# Patient Record
Sex: FEMALE | Race: BLACK OR AFRICAN AMERICAN | NOT HISPANIC OR LATINO | Employment: OTHER | URBAN - METROPOLITAN AREA
[De-identification: names, ages, dates, MRNs, and addresses within clinical notes are randomized per-mention and may not be internally consistent; named-entity substitution may affect disease eponyms.]

---

## 2013-01-21 LAB — CRC RECOMMENDATION EXT: NORMAL

## 2021-07-15 LAB — BCS RECOMMENDATION EXT: NORMAL

## 2022-12-03 ENCOUNTER — DOCUMENTATION ONLY (OUTPATIENT)
Dept: INTERNAL MEDICINE | Facility: CLINIC | Age: 62
End: 2022-12-03
Payer: COMMERCIAL

## 2022-12-29 ENCOUNTER — OFFICE VISIT (OUTPATIENT)
Dept: HEMATOLOGY/ONCOLOGY | Facility: CLINIC | Age: 62
End: 2022-12-29
Payer: COMMERCIAL

## 2022-12-29 VITALS
SYSTOLIC BLOOD PRESSURE: 112 MMHG | BODY MASS INDEX: 53.92 KG/M2 | HEIGHT: 62 IN | TEMPERATURE: 98 F | RESPIRATION RATE: 18 BRPM | DIASTOLIC BLOOD PRESSURE: 69 MMHG | OXYGEN SATURATION: 100 % | HEART RATE: 85 BPM | WEIGHT: 293 LBS

## 2022-12-29 DIAGNOSIS — C73 THYROID CANCER: Primary | ICD-10-CM

## 2022-12-29 PROCEDURE — 99214 PR OFFICE/OUTPT VISIT, EST, LEVL IV, 30-39 MIN: ICD-10-PCS | Mod: ,,, | Performed by: NURSE PRACTITIONER

## 2022-12-29 PROCEDURE — 99214 OFFICE O/P EST MOD 30 MIN: CPT | Mod: ,,, | Performed by: NURSE PRACTITIONER

## 2022-12-29 PROCEDURE — 3074F PR MOST RECENT SYSTOLIC BLOOD PRESSURE < 130 MM HG: ICD-10-PCS | Mod: CPTII,,, | Performed by: NURSE PRACTITIONER

## 2022-12-29 PROCEDURE — 4010F PR ACE/ARB THEARPY RXD/TAKEN: ICD-10-PCS | Mod: CPTII,,, | Performed by: NURSE PRACTITIONER

## 2022-12-29 PROCEDURE — 3078F PR MOST RECENT DIASTOLIC BLOOD PRESSURE < 80 MM HG: ICD-10-PCS | Mod: CPTII,,, | Performed by: NURSE PRACTITIONER

## 2022-12-29 PROCEDURE — 3008F BODY MASS INDEX DOCD: CPT | Mod: CPTII,,, | Performed by: NURSE PRACTITIONER

## 2022-12-29 PROCEDURE — 1159F PR MEDICATION LIST DOCUMENTED IN MEDICAL RECORD: ICD-10-PCS | Mod: CPTII,,, | Performed by: NURSE PRACTITIONER

## 2022-12-29 PROCEDURE — 4010F ACE/ARB THERAPY RXD/TAKEN: CPT | Mod: CPTII,,, | Performed by: NURSE PRACTITIONER

## 2022-12-29 PROCEDURE — 3008F PR BODY MASS INDEX (BMI) DOCUMENTED: ICD-10-PCS | Mod: CPTII,,, | Performed by: NURSE PRACTITIONER

## 2022-12-29 PROCEDURE — 1159F MED LIST DOCD IN RCRD: CPT | Mod: CPTII,,, | Performed by: NURSE PRACTITIONER

## 2022-12-29 PROCEDURE — 3078F DIAST BP <80 MM HG: CPT | Mod: CPTII,,, | Performed by: NURSE PRACTITIONER

## 2022-12-29 PROCEDURE — 3074F SYST BP LT 130 MM HG: CPT | Mod: CPTII,,, | Performed by: NURSE PRACTITIONER

## 2022-12-29 RX ORDER — ALENDRONATE SODIUM 70 MG/1
70 TABLET ORAL
COMMUNITY
Start: 2022-10-03

## 2022-12-29 RX ORDER — ATORVASTATIN CALCIUM 40 MG/1
40 TABLET, FILM COATED ORAL
COMMUNITY
Start: 2022-11-10

## 2022-12-29 RX ORDER — GLIMEPIRIDE 1 MG/1
1 TABLET ORAL
COMMUNITY

## 2022-12-29 RX ORDER — LISINOPRIL AND HYDROCHLOROTHIAZIDE 12.5; 2 MG/1; MG/1
1 TABLET ORAL
COMMUNITY
Start: 2022-10-04

## 2022-12-29 RX ORDER — METFORMIN HYDROCHLORIDE 500 MG/1
500 TABLET ORAL
COMMUNITY
Start: 2022-10-03

## 2022-12-29 RX ORDER — LEVOTHYROXINE SODIUM 175 UG/1
TABLET ORAL
COMMUNITY
End: 2022-12-30 | Stop reason: SDUPTHER

## 2022-12-29 RX ORDER — FOLIC ACID 1 MG/1
1000 TABLET ORAL
COMMUNITY
Start: 2022-11-10 | End: 2023-02-24 | Stop reason: SDUPTHER

## 2022-12-29 RX ORDER — PIOGLITAZONEHYDROCHLORIDE 45 MG/1
45 TABLET ORAL
COMMUNITY
Start: 2022-10-03

## 2022-12-29 RX ORDER — ACETAMINOPHEN 500 MG
TABLET ORAL
COMMUNITY

## 2022-12-29 NOTE — PROGRESS NOTES
"[facility]    PATIENT: Sarah Majano  MRN: [id number]  DATE: 12/29/2022    Diagnosis: History of thyroid cancer.     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    Colonoscopy 2011  MMG 7/2021  DEXA 5/2019    Subjective:   Interval History:  12/29/2022:  Ms. Majano presents to clinic for a one year follow up visit for history of thyroid cancer.  States she has been feeling "great". Has no complaints.  Appetite is good. She has gained some weight since last visit here, but states she is working on weight loss.    Continues to take Levothyroxine 175 mcg daily.   Up to date on her mammogram. April 2022. Per patient report normal.     Past Medical History:   Diagnosis Date    Diabetes mellitus     Hypertension     Mixed hyperlipidemia     Thyroid disease        Past Surgical HIstory: History reviewed. No pertinent surgical history.    Family History   Problem Relation Age of Onset    Hyperthyroidism Mother     Diabetes Mother     Heart disease Father      Social History:  reports that she has never smoked. She has never used smokeless tobacco. She reports that she does not drink alcohol and does not use drugs.    Allergies:  Review of patient's allergies indicates:  Not on File    Medications:  Current Outpatient Medications   Medication Sig Dispense Refill    alendronate (FOSAMAX) 70 MG tablet Take 70 mg by mouth every 7 days.      atorvastatin (LIPITOR) 40 MG tablet Take 40 mg by mouth.      cholecalciferol, vitamin D3, (VITAMIN D3) 50 mcg (2,000 unit) Cap capsule Vitamin D3   daily      folic acid (FOLVITE) 1 MG tablet Take 1,000 mcg by mouth.      glimepiride (AMARYL) 1 MG tablet Take 1 mg by mouth.      levothyroxine (SYNTHROID, LEVOTHROID) 175 MCG tablet levothyroxine " "175 mcg tablet   TAKE 1 TABLET BY MOUTH ONCE DAILY      lisinopriL-hydrochlorothiazide (PRINZIDE,ZESTORETIC) 20-12.5 mg per tablet Take 1 tablet by mouth.      metFORMIN (GLUCOPHAGE) 500 MG tablet Take 500 mg by mouth.      pioglitazone (ACTOS) 45 MG tablet Take 45 mg by mouth.       No current facility-administered medications for this visit.     Review of Systems:   CONSTITUTIONAL: no fevers, no chills,  weight gain, no fatigue, no weakness  HEMATOLOGIC: no abnormal bleeding, no abnormal bruising, no drenching night sweats  ONCOLOGIC: no new masses or lumps  HEENT: no vision loss, no tinnitus or hearing loss, no nose bleeding, no dysphagia, no odynophagia  CVS: no chest pain, no palpitations, no dyspnea on exertion  RESP: no shortness of breath, no hemoptysis, no cough  BREAST: no nipple discharge, no breast tenderness, no breast masses on self breast examination  GI: no nausea, no vomiting, no diarrhea, no constipation, no melena, no hematochezia, no hematemesis, no abdominal pain, no increase in abdominal girth  : no dysuria, no hematuria, no discharge  GYN: no abnormal vaginal bleeding, no dyspareunia, no vaginal discharge  INTEGUMENT: no rashes, no abnormal bruising, no nail pitting, no hyperpigmentation  NEURO: no falls, no memory loss, no paresthesias or dysesthesias, no urofecal incontinence or retention, no loss of strength on any extremity  MSK: no back pain, no new joint pain, no joint swelling  PSYCH: no suicidal or homicidal ideation, no depression, no insomnia, no anhedonia  ENDOCRINE: no heat or cold intolerance, no polyuria, no polydipsia    ECOG Performance Status: 1   Objective:      Vitals:    12/29/22 1139   BP: 112/69   BP Location: Left arm   Patient Position: Sitting   BP Method: Large (Automatic)   Pulse: 85   Resp: 18   Temp: 98.3 °F (36.8 °C)   TempSrc: Oral   SpO2: 100%   Weight: (!) 173.3 kg (382 lb)   Height: 5' 2" (1.575 m)     BMI: Body mass index is 69.87 kg/m².    Physical " Exam:  GA: AAOx3, NAD. Obese.   HEENT: NCAT, PERRLA, EOMI, moist oropharynx, no oral ulcers  LYMPH: no cervical, axillary or supraclavicular region lymphadenopathy  CVS: s1s2 RRR, no M/R/G  RESP: CTA b/l, no crackles, no wheezes or rhonchi  ABD: soft, NT, ND, BS+, no hepatosplenomegaly  EXT: no deformities, mild b/l pitting pedal edema  SKIN: no rashes, no bruises or purpura, warm and dry  NEURO: normal mentation, strength 5/5 on all 4 extremities, no sensory deficits    Laboratory results:  12/22/2022:  Creatinine 1.2, albumin 3.8, free T4 1.75, TSH 0.45, iron 70, sat% 24, ferritin 102, folate 42.7, B12 721, WBC 4.2, HGB 10.1, .0, platelets 226  9/20/21 Tg Ab <1 Tg <0.1      9/16/21 Cr 1.4 Alb 3.5 TP 8.1 FT4 1.44 AlkPhos 68 AST 8 ALT 15 TSH 0.43 A1C 5.9 Ferritin 132 Folate >20 WBC 5 RBC 3.05 Hg 9.3 MV 98.4  Retic 1.56% Vit D 33.5  6/16/21 Cr 1.4, TSH 1.04 FT4 1.47, thyroglobulin Ab < 0.1, Folate > 20, Ferritin 118, B12 712, albumin urine, <12, Iron 75, TIBC 298, iron sat 25%, WBC 3.4, Hgb 9.4, MCV 99.4, , Vit D 40, retic 1.10%  3/19/21 folate > 20, B12 864, retic 1.90%, WBC 5.7, Hgb 9.3, MCV 97.4, , , urine albumin < 12.0, Vit D 45, EPO 13.5, Thyroglobulin Ab <1.0, Thyroglobulin <0.1, Cr 1.1, ferritin 100, ESR 88, TSH 1.57, FT4 3.08, hgb electrophoresis pending**  12/16/20  Vit D 40.6 CRP 1.7 urine albumin <12 Cholesterol 11 HLD 62 LDL 42 ESR 61 Iron 60 TIBC 315 A1C 5.8 WBC 4.8 Hg 8.6 .5  FT4 1.33 TSH 26.98 Folate 8.3 B12 880 ferritin 101 Cr 1.2 TP 8.2 Retic 2.11 Copper 151 SPEP w/ RIN no M spike Abs kappa 35.6 Abs lambda 15.4 SFLC ratio 2.31  12/16/20 TG ab <1 Tg 0.1  9/8/20 Cr 1.9 CO2 20.9 TSH 62.79 A1C 5.7 Alb 4 TP 8.4 AlkPhos 55 AST 15 ALT 9 FT4 0.76 WBC 4.9 RBC 2.99 Hg 9.7  RDW 14.5  FT3 1.1  5/29/20 TSH 81.49 Free T4 0.73    Path:  3/31/2004 Thyroid left lobectomy: multinodular thyroid parenchyma. Small hypercellular dominant nodule. No  evidence of malignancy identified. No parathyroid glands identified.  3/8/2004 Right thyroid lobectomy: oncocytic carcinoma (Hurthle cell carcinoma), minimally invasive 3.8cm, capsular invasion present.    Imagin/15/21 MMG: BIRADS2  20 US thyroid: 10x8.2x6mm residual thyroid tissue in right thyroid bed  20 Screening MMG: BIRADS 2  19 CT chest w/o contrast: prominent solitary pulmonary cyst on right most compatible with benign bulla/bleb. 4.5x4.2x4cm  4/15/16 US thyroid: small amount of residual thyroid tissue on the right    Assessment:   1. History of thyroid cancer Z85.850 S/p left and right thyroid lobectomy 3/2004 with Hurthle cell carcinoma, minimally invasive  Pt reports that she received FLORES, but have no records available  Thyroid US showing some residual thyroid tissue . Repeat US thyroid 2020 unchanged  TSH throughout  has been significantly elevated, most recent 2021 WNL 0.4  Tg and Tg Ab unremarkable  2019 CT chest without any evidence to suggest metastatic disease though solitary pulm cyst consistent with bleb/bulla was seen on right side.     --2022 - TSH normal range. FT4 mild elevation. She states compliance with taking Levothyroxine. She is feeling well.   --Recommend continue to take Levothyroxine 175 mcg. Total protein elevated 8.4. Patient states has not been drinking much water.  --Recommend she hydrate daily with water(recommended daily amount reviewed). Recheck TSH/FT4 in 2 weeks(per patient request)    2. Macrocytic anemia D53.9 Pt reports a hx of KEY, however her labs show mild macrocytosis with normal PLT and WBC counts. Likely related to CKD  SPEP, RIN, SFLC normal, B12 normal, copper normal, Retic inappropriately normal, folate deficient. She denies any bleeding.   HGB and iron stores, decreased from previous values just a week ago. Still WNL. Most recent labs checked by PCP. She plans on reviewing labs with her at appointment next week and see if  she wants her to start oral iron. Continue with folic acid 1mg daily.     3. Morbid obesity E66.01    -- She has gained weight.Discussed weight loss. States she is planning on working on weight loss.     4. Health maintenance - per patient report up to date on mammogram. Done 4/2022 and was normal.   Plan:   Continue current dose of Levothyroxine 175 mcg daily. RX sent to pharmacy.   Hydrate well daily with water.   Recheck thyroid levels in 2 weeks.   Return to clinic in 1 year with CBC, CMP, TG AB, TG, TSH, FT4 and ferritin/iron/tibc    Safia Bunn NP-C    ADDENDUM:   1/19/2023. Called patient with TSH results from 01/12/2023. [id number]. States was out of Thryoid medication for 3 days prior to getting test done.   Is back on levothyroxine 175 mcg now. Recommend rechecking TSH in 6 weeks. Patient agrees.

## 2022-12-30 RX ORDER — LEVOTHYROXINE SODIUM 175 UG/1
TABLET ORAL
Qty: 30 TABLET | Refills: 5 | OUTPATIENT
Start: 2022-12-30 | End: 2023-01-09

## 2023-01-09 DIAGNOSIS — C73 THYROID CANCER: Primary | ICD-10-CM

## 2023-01-09 RX ORDER — LEVOTHYROXINE SODIUM 175 UG/1
175 TABLET ORAL
Qty: 30 TABLET | Refills: 5 | Status: SHIPPED | OUTPATIENT
Start: 2023-01-09 | End: 2023-07-06

## 2023-02-24 DIAGNOSIS — C73 THYROID CANCER: Primary | ICD-10-CM

## 2023-02-24 RX ORDER — FOLIC ACID 1 MG/1
1000 TABLET ORAL DAILY
Qty: 30 TABLET | Refills: 3 | Status: SHIPPED | OUTPATIENT
Start: 2023-02-24 | End: 2023-07-06 | Stop reason: SDUPTHER

## 2023-07-06 DIAGNOSIS — C73 THYROID CANCER: ICD-10-CM

## 2023-07-06 RX ORDER — LEVOTHYROXINE SODIUM 175 UG/1
175 TABLET ORAL
Qty: 30 TABLET | Refills: 5 | Status: SHIPPED | OUTPATIENT
Start: 2023-07-06 | End: 2024-01-02

## 2023-07-06 RX ORDER — FOLIC ACID 1 MG/1
1000 TABLET ORAL DAILY
Qty: 30 TABLET | Refills: 3 | Status: SHIPPED | OUTPATIENT
Start: 2023-07-06 | End: 2023-11-13

## 2023-07-21 ENCOUNTER — TELEPHONE (OUTPATIENT)
Dept: HEMATOLOGY/ONCOLOGY | Facility: CLINIC | Age: 63
End: 2023-07-21
Payer: COMMERCIAL

## 2023-07-21 NOTE — TELEPHONE ENCOUNTER
----- Message from Faisal Molina MD sent at 7/20/2023 10:25 AM CDT -----  She needs an appointment with her PCP for  anemia, not us.    If the PCP does the basic workup and is still not able to figure it out she can be seen in our clinic.      Thank you.     ----- Message -----  From: Chikis Sandy LPN  Sent: 7/20/2023   7:58 AM CDT  To: Faisal Molina MD    Labs scanned in Epic. Please advise on if Pt needs sooner appt.--TYLER VILLAREAL  ----- Message -----  From: Herson Hutton  Sent: 7/17/2023   1:38 PM CDT  To: Chikis Sandy LPN    Good afternoon,     Pt is scheduled for follow up in Dec. Nursing home called today requesting an earlier visit due to abnormal labs not critical. I instructed nursing home to fax over the labs for provider to review prior to scheduling her.     Thank you,  Herson

## 2023-07-24 NOTE — TELEPHONE ENCOUNTER
Dr. Matute's office returned call notified of needing follow up and work up with PCP for anemia.--SC, LPN

## 2023-08-09 DIAGNOSIS — D50.0 ANEMIA DUE TO CHRONIC BLOOD LOSS: Primary | ICD-10-CM

## 2023-08-16 ENCOUNTER — OFFICE VISIT (OUTPATIENT)
Dept: HEMATOLOGY/ONCOLOGY | Facility: CLINIC | Age: 63
End: 2023-08-16
Payer: COMMERCIAL

## 2023-08-16 VITALS
DIASTOLIC BLOOD PRESSURE: 76 MMHG | RESPIRATION RATE: 18 BRPM | HEIGHT: 62 IN | TEMPERATURE: 98 F | SYSTOLIC BLOOD PRESSURE: 153 MMHG | OXYGEN SATURATION: 98 % | BODY MASS INDEX: 53.92 KG/M2 | WEIGHT: 293 LBS | HEART RATE: 70 BPM

## 2023-08-16 DIAGNOSIS — D50.0 ANEMIA DUE TO CHRONIC BLOOD LOSS: ICD-10-CM

## 2023-08-16 DIAGNOSIS — D53.9 MACROCYTIC ANEMIA: ICD-10-CM

## 2023-08-16 DIAGNOSIS — C73 THYROID CANCER: ICD-10-CM

## 2023-08-16 PROCEDURE — 4010F ACE/ARB THERAPY RXD/TAKEN: CPT | Mod: CPTII,,, | Performed by: STUDENT IN AN ORGANIZED HEALTH CARE EDUCATION/TRAINING PROGRAM

## 2023-08-16 PROCEDURE — 99215 OFFICE O/P EST HI 40 MIN: CPT | Mod: ,,, | Performed by: STUDENT IN AN ORGANIZED HEALTH CARE EDUCATION/TRAINING PROGRAM

## 2023-08-16 PROCEDURE — 3008F BODY MASS INDEX DOCD: CPT | Mod: CPTII,,, | Performed by: STUDENT IN AN ORGANIZED HEALTH CARE EDUCATION/TRAINING PROGRAM

## 2023-08-16 PROCEDURE — 3077F SYST BP >= 140 MM HG: CPT | Mod: CPTII,,, | Performed by: STUDENT IN AN ORGANIZED HEALTH CARE EDUCATION/TRAINING PROGRAM

## 2023-08-16 PROCEDURE — 99215 PR OFFICE/OUTPT VISIT, EST, LEVL V, 40-54 MIN: ICD-10-PCS | Mod: ,,, | Performed by: STUDENT IN AN ORGANIZED HEALTH CARE EDUCATION/TRAINING PROGRAM

## 2023-08-16 PROCEDURE — 3078F DIAST BP <80 MM HG: CPT | Mod: CPTII,,, | Performed by: STUDENT IN AN ORGANIZED HEALTH CARE EDUCATION/TRAINING PROGRAM

## 2023-08-16 PROCEDURE — 3077F PR MOST RECENT SYSTOLIC BLOOD PRESSURE >= 140 MM HG: ICD-10-PCS | Mod: CPTII,,, | Performed by: STUDENT IN AN ORGANIZED HEALTH CARE EDUCATION/TRAINING PROGRAM

## 2023-08-16 PROCEDURE — 3008F PR BODY MASS INDEX (BMI) DOCUMENTED: ICD-10-PCS | Mod: CPTII,,, | Performed by: STUDENT IN AN ORGANIZED HEALTH CARE EDUCATION/TRAINING PROGRAM

## 2023-08-16 PROCEDURE — 4010F PR ACE/ARB THEARPY RXD/TAKEN: ICD-10-PCS | Mod: CPTII,,, | Performed by: STUDENT IN AN ORGANIZED HEALTH CARE EDUCATION/TRAINING PROGRAM

## 2023-08-16 PROCEDURE — 3078F PR MOST RECENT DIASTOLIC BLOOD PRESSURE < 80 MM HG: ICD-10-PCS | Mod: CPTII,,, | Performed by: STUDENT IN AN ORGANIZED HEALTH CARE EDUCATION/TRAINING PROGRAM

## 2023-08-16 PROCEDURE — 1159F PR MEDICATION LIST DOCUMENTED IN MEDICAL RECORD: ICD-10-PCS | Mod: CPTII,,, | Performed by: STUDENT IN AN ORGANIZED HEALTH CARE EDUCATION/TRAINING PROGRAM

## 2023-08-16 PROCEDURE — 1159F MED LIST DOCD IN RCRD: CPT | Mod: CPTII,,, | Performed by: STUDENT IN AN ORGANIZED HEALTH CARE EDUCATION/TRAINING PROGRAM

## 2023-08-16 NOTE — PROGRESS NOTES
PATIENT: Sarah Majano  MRN: [id number]  DATE: 2023    Diagnosis: History of thyroid cancer.  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    Colonoscopy 2023  MMG 2021  DEXA 2019    Patient was following with us for a history of thyroid cancer however was referred again for a new diagnosis of worsening normocytic anemia.    Subjective:   Interval History:  Today, 2023, patient denies any acute concerns today.  She denies any chest pain, shortness of breath, fatigue.  She denies any GI blood loss.  She reports undergoing colonoscopy earlier this year with Dr. Flowers which was reportedly within normal limits.  She denies any new medications including blood thinners.    Past Medical History:   Diagnosis Date    Anemia     Diabetes mellitus     Hurthle cell carcinoma of thyroid 2004    Hypertension     Mixed hyperlipidemia     Thyroid disease        Past Surgical HIstory:   Past Surgical History:   Procedure Laterality Date     SECTION, CLASSIC      THYROIDECTOMY         Family History   Problem Relation Age of Onset    Hyperthyroidism Mother     Diabetes Mother     Heart disease Father      Social History:  reports that she has never smoked. She has never used smokeless tobacco. She reports that she does not drink alcohol and does not use drugs.    Allergies:  Review of patient's allergies indicates:  Not on File    Medications:  Current Outpatient Medications   Medication Sig Dispense Refill    alendronate (FOSAMAX) 70 MG tablet Take 70 mg by mouth every 7 days.      atorvastatin (LIPITOR) 40 MG tablet Take 40 mg by mouth.      cholecalciferol, vitamin D3, (VITAMIN  D3) 50 mcg (2,000 unit) Cap capsule Vitamin D3   daily      folic acid (FOLVITE) 1 MG tablet Take 1 tablet (1,000 mcg total) by mouth once daily. 30 tablet 3    glimepiride (AMARYL) 1 MG tablet Take 1 mg by mouth.      levothyroxine (SYNTHROID, LEVOTHROID) 175 MCG tablet Take 1 tablet (175 mcg total) by mouth before breakfast. 30 tablet 5    lisinopriL-hydrochlorothiazide (PRINZIDE,ZESTORETIC) 20-12.5 mg per tablet Take 1 tablet by mouth.      metFORMIN (GLUCOPHAGE) 500 MG tablet Take 500 mg by mouth.      pioglitazone (ACTOS) 45 MG tablet Take 45 mg by mouth.       No current facility-administered medications for this visit.     Review of Systems:   CONSTITUTIONAL: no fevers, no chills,  weight gain, no fatigue, no weakness  HEMATOLOGIC: no abnormal bleeding, no abnormal bruising, no drenching night sweats  ONCOLOGIC: no new masses or lumps  HEENT: no vision loss, no tinnitus or hearing loss, no nose bleeding, no dysphagia, no odynophagia  CVS: no chest pain, no palpitations, no dyspnea on exertion  RESP: no shortness of breath, no hemoptysis, no cough  BREAST: no nipple discharge, no breast tenderness, no breast masses on self breast examination  GI: no nausea, no vomiting, no diarrhea, no constipation, no melena, no hematochezia, no hematemesis, no abdominal pain, no increase in abdominal girth  : no dysuria, no hematuria, no discharge  GYN: no abnormal vaginal bleeding, no dyspareunia, no vaginal discharge  INTEGUMENT: no rashes, no abnormal bruising, no nail pitting, no hyperpigmentation  NEURO: no falls, no memory loss, no paresthesias or dysesthesias, no urofecal incontinence or retention, no loss of strength on any extremity  MSK: no back pain, no new joint pain, no joint swelling  PSYCH: no suicidal or homicidal ideation, no depression, no insomnia, no anhedonia  ENDOCRINE: no heat or cold intolerance, no polyuria, no polydipsia    ECOG Performance Status: 1   Objective:      Vitals:    08/16/23 1458  "  BP: (!) 153/76   BP Location: Left arm   Patient Position: Sitting   BP Method: Large (Automatic)   Pulse: 70   Resp: 18   Temp: 97.9 °F (36.6 °C)   TempSrc: Oral   SpO2: 98%   Weight: (!) 175.3 kg (386 lb 6.4 oz)   Height: 5' 2" (1.575 m)       BMI: Body mass index is 70.67 kg/m².    Physical Exam:  GA: AAOx3, NAD. Obese.   HEENT: NCAT, no oral ulcers  LYMPH: no cervical, axillary or supraclavicular region lymphadenopathy  CVS: s1s2 RRR, + murmur  RESP: CTA b/l, no crackles, no wheezes or rhonchi  ABD: soft, NT, ND, BS+, no hepatosplenomegaly  EXT: no deformities, mild b/l pitting pedal edema  SKIN: no rashes, no bruises or purpura, warm and dry  NEURO: normal mentation, strength 5/5 on all 4 extremities, no sensory deficits    Laboratory results:    07/12/2023 vitamin B12 587, folic acid more than 20, hemoglobin 8.0, , platelet count 232  06/12/2023 creatinine 1.64, LFTs within normal limits, albumin 4 point hemoglobin 8.3, ,  ferritin 130, iron saturation 23, iron 68, TIBC 296.     12/22/2022:  Creatinine 1.2, albumin 3.8, free T4 1.75, TSH 0.45, iron 70, sat% 24, ferritin 102, folate 42.7, B12 721, WBC 4.2, HGB 10.1, .0, platelets 226  9/20/21 Tg Ab <1 Tg <0.1      9/16/21 Cr 1.4 Alb 3.5 TP 8.1 FT4 1.44 AlkPhos 68 AST 8 ALT 15 TSH 0.43 A1C 5.9 Ferritin 132 Folate >20 WBC 5 RBC 3.05 Hg 9.3 MV 98.4  Retic 1.56% Vit D 33.5  6/16/21 Cr 1.4, TSH 1.04 FT4 1.47, thyroglobulin Ab < 0.1, Folate > 20, Ferritin 118, B12 712, albumin urine, <12, Iron 75, TIBC 298, iron sat 25%, WBC 3.4, Hgb 9.4, MCV 99.4, , Vit D 40, retic 1.10%  3/19/21 folate > 20, B12 864, retic 1.90%, WBC 5.7, Hgb 9.3, MCV 97.4, , , urine albumin < 12.0, Vit D 45, EPO 13.5, Thyroglobulin Ab <1.0, Thyroglobulin <0.1, Cr 1.1, ferritin 100, ESR 88, TSH 1.57, FT4 3.08, hgb electrophoresis pending**  12/16/20  Vit D 40.6 CRP 1.7 urine albumin <12 Cholesterol 11 HLD 62 LDL 42 ESR 61 Iron 60 TIBC " 315 A1C 5.8 WBC 4.8 Hg 8.6 .5  FT4 1.33 TSH 26.98 Folate 8.3 B12 880 ferritin 101 Cr 1.2 TP 8.2 Retic 2.11 Copper 151 SPEP w/ RIN no M spike Abs kappa 35.6 Abs lambda 15.4 SFLC ratio 2.31  20 TG ab <1 Tg 0.1  20 Cr 1.9 CO2 20.9 TSH 62.79 A1C 5.7 Alb 4 TP 8.4 AlkPhos 55 AST 15 ALT 9 FT4 0.76 WBC 4.9 RBC 2.99 Hg 9.7  RDW 14.5  FT3 1.1  20 TSH 81.49 Free T4 0.73    Path:  3/31/2004 Thyroid left lobectomy: multinodular thyroid parenchyma. Small hypercellular dominant nodule. No evidence of malignancy identified. No parathyroid glands identified.  3/8/2004 Right thyroid lobectomy: oncocytic carcinoma (Hurthle cell carcinoma), minimally invasive 3.8cm, capsular invasion present.    Imagin/15/21 MMG: BIRADS2  20 US thyroid: 10x8.2x6mm residual thyroid tissue in right thyroid bed  20 Screening MMG: BIRADS 2  19 CT chest w/o contrast: prominent solitary pulmonary cyst on right most compatible with benign bulla/bleb. 4.5x4.2x4cm  4/15/16 US thyroid: small amount of residual thyroid tissue on the right    Assessment:     # Macrocytic anemia D53.9 Pt reports a hx of KEY, however her labs show mild macrocytosis with normal PLT and WBC counts. Likely related to CKD  During her initial visit SPEP, RIN, SFLC normal, B12 normal, copper normal, Retic inappropriately normal, folate deficient.  She was started on folic acid 1 mg q.day   More recently she was found to have worsening anemia with a hemoglobin on 8 grams/deciliter prompting a early a follow-up visit with us.          # History of thyroid cancer Z85.850 S/p left and right thyroid lobectomy 3/2004 with Hurthle cell carcinoma, minimally invasive  Pt reports that she received FLORES, but have no records available  Thyroid US showing some residual thyroid tissue . Repeat US thyroid 2020 unchanged  TSH throughout  has been significantly elevated,  2021 WNL 0.4  Tg and Tg Ab unremarkable  2019 CT chest  without any evidence to suggest metastatic disease though solitary pulm cyst consistent with bleb/bulla was seen on right side.   12/30/2022 - TSH normal range. FT4 mild elevation.           Plan:       Noted worsening microcytic anemia with a hemoglobin around 8 grams/deciliter.  Patient's previous baseline was around 9-10 grams/deciliter.    Will plan to repeat patient's blood work including CBC, CMP, path review of smear, retic count, folic acid, vitamin B12 level, myeloma panel, copper, haptoglobin, LDH today  If above workup is negative, will proceed with a bone marrow biopsy to rule out primary bone marrow disorders in the setting of worsening microcytic anemia.    Plan to follow-up in clinic in 2-3 weeks to discuss above workup and further treatment recommendations.        A total of  40 minutes were spent in review of records, interpretation of test, coordination of care, discussion and counseling with the patient.        Portions of the record may have been created with voice recognition software. Occasional wrong-word or sound-a-like substitutions may have occurred due to the inherent limitations of voice recognition software. Read the chart carefully and recognize, using context, where substitutions have occurred.

## 2023-09-07 ENCOUNTER — OFFICE VISIT (OUTPATIENT)
Dept: HEMATOLOGY/ONCOLOGY | Facility: CLINIC | Age: 63
End: 2023-09-07
Payer: COMMERCIAL

## 2023-09-07 VITALS
HEART RATE: 88 BPM | RESPIRATION RATE: 18 BRPM | OXYGEN SATURATION: 95 % | BODY MASS INDEX: 53.92 KG/M2 | SYSTOLIC BLOOD PRESSURE: 172 MMHG | DIASTOLIC BLOOD PRESSURE: 77 MMHG | HEIGHT: 62 IN | TEMPERATURE: 98 F | WEIGHT: 293 LBS

## 2023-09-07 DIAGNOSIS — C73 THYROID CANCER: Primary | ICD-10-CM

## 2023-09-07 DIAGNOSIS — D53.9 MACROCYTIC ANEMIA: ICD-10-CM

## 2023-09-07 PROCEDURE — 3008F PR BODY MASS INDEX (BMI) DOCUMENTED: ICD-10-PCS | Mod: CPTII,,, | Performed by: STUDENT IN AN ORGANIZED HEALTH CARE EDUCATION/TRAINING PROGRAM

## 2023-09-07 PROCEDURE — 4010F PR ACE/ARB THEARPY RXD/TAKEN: ICD-10-PCS | Mod: CPTII,,, | Performed by: STUDENT IN AN ORGANIZED HEALTH CARE EDUCATION/TRAINING PROGRAM

## 2023-09-07 PROCEDURE — 3078F PR MOST RECENT DIASTOLIC BLOOD PRESSURE < 80 MM HG: ICD-10-PCS | Mod: CPTII,,, | Performed by: STUDENT IN AN ORGANIZED HEALTH CARE EDUCATION/TRAINING PROGRAM

## 2023-09-07 PROCEDURE — 4010F ACE/ARB THERAPY RXD/TAKEN: CPT | Mod: CPTII,,, | Performed by: STUDENT IN AN ORGANIZED HEALTH CARE EDUCATION/TRAINING PROGRAM

## 2023-09-07 PROCEDURE — 3077F SYST BP >= 140 MM HG: CPT | Mod: CPTII,,, | Performed by: STUDENT IN AN ORGANIZED HEALTH CARE EDUCATION/TRAINING PROGRAM

## 2023-09-07 PROCEDURE — 3077F PR MOST RECENT SYSTOLIC BLOOD PRESSURE >= 140 MM HG: ICD-10-PCS | Mod: CPTII,,, | Performed by: STUDENT IN AN ORGANIZED HEALTH CARE EDUCATION/TRAINING PROGRAM

## 2023-09-07 PROCEDURE — 3008F BODY MASS INDEX DOCD: CPT | Mod: CPTII,,, | Performed by: STUDENT IN AN ORGANIZED HEALTH CARE EDUCATION/TRAINING PROGRAM

## 2023-09-07 PROCEDURE — 99214 OFFICE O/P EST MOD 30 MIN: CPT | Mod: ,,, | Performed by: STUDENT IN AN ORGANIZED HEALTH CARE EDUCATION/TRAINING PROGRAM

## 2023-09-07 PROCEDURE — 3078F DIAST BP <80 MM HG: CPT | Mod: CPTII,,, | Performed by: STUDENT IN AN ORGANIZED HEALTH CARE EDUCATION/TRAINING PROGRAM

## 2023-09-07 PROCEDURE — 1159F MED LIST DOCD IN RCRD: CPT | Mod: CPTII,,, | Performed by: STUDENT IN AN ORGANIZED HEALTH CARE EDUCATION/TRAINING PROGRAM

## 2023-09-07 PROCEDURE — 99214 PR OFFICE/OUTPT VISIT, EST, LEVL IV, 30-39 MIN: ICD-10-PCS | Mod: ,,, | Performed by: STUDENT IN AN ORGANIZED HEALTH CARE EDUCATION/TRAINING PROGRAM

## 2023-09-07 PROCEDURE — 1159F PR MEDICATION LIST DOCUMENTED IN MEDICAL RECORD: ICD-10-PCS | Mod: CPTII,,, | Performed by: STUDENT IN AN ORGANIZED HEALTH CARE EDUCATION/TRAINING PROGRAM

## 2023-09-07 RX ORDER — ALLOPURINOL 100 MG/1
100 TABLET ORAL DAILY
COMMUNITY
Start: 2023-08-15

## 2023-09-07 NOTE — PROGRESS NOTES
PATIENT: Sarah Majano  MRN: [id number]  DATE: 2023    Diagnosis: History of thyroid cancer.  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    Colonoscopy 2023  MMG 2021  DEXA 2019    Patient was following with us for a history of thyroid cancer however was referred again for a new diagnosis of worsening macrocytic anemia.        Subjective:   Interval History:  Today, 2023, patient denies any acute concerns today.  She denies any symptoms of chest pain, shortness of breath, easy bruising, bleeding, decreased appetite, weight loss, night sweats, fevers, chills or new lumps or bumps.  She denies any new medications, ER or hospital visits since last follow-up visit with us.    Past Medical History:   Diagnosis Date    Anemia     Diabetes mellitus     Hurthle cell carcinoma of thyroid 2004    Hypertension     Mixed hyperlipidemia     Thyroid disease        Past Surgical HIstory:   Past Surgical History:   Procedure Laterality Date     SECTION, CLASSIC      THYROIDECTOMY         Family History   Problem Relation Age of Onset    Hyperthyroidism Mother     Diabetes Mother     Heart disease Father      Social History:  reports that she has never smoked. She has never used smokeless tobacco. She reports that she does not drink alcohol and does not use drugs.    Allergies:  Review of patient's allergies indicates:  No Known Allergies    Medications:  Current Outpatient Medications   Medication Sig Dispense Refill    alendronate (FOSAMAX) 70 MG tablet Take 70 mg by mouth every 7 days.      allopurinoL (ZYLOPRIM) 100 MG tablet Take 100 mg by mouth once daily.      atorvastatin (LIPITOR) 40 MG tablet Take 40 mg by  mouth.      cholecalciferol, vitamin D3, (VITAMIN D3) 50 mcg (2,000 unit) Cap capsule Vitamin D3   daily      folic acid (FOLVITE) 1 MG tablet Take 1 tablet (1,000 mcg total) by mouth once daily. 30 tablet 3    glimepiride (AMARYL) 1 MG tablet Take 1 mg by mouth.      levothyroxine (SYNTHROID, LEVOTHROID) 175 MCG tablet Take 1 tablet (175 mcg total) by mouth before breakfast. 30 tablet 5    lisinopriL-hydrochlorothiazide (PRINZIDE,ZESTORETIC) 20-12.5 mg per tablet Take 1 tablet by mouth.      metFORMIN (GLUCOPHAGE) 500 MG tablet Take 500 mg by mouth.      pioglitazone (ACTOS) 45 MG tablet Take 45 mg by mouth.       No current facility-administered medications for this visit.     Review of Systems:   CONSTITUTIONAL: no fevers, no chills,  weight gain, no fatigue, no weakness  HEMATOLOGIC: no abnormal bleeding, no abnormal bruising, no drenching night sweats  ONCOLOGIC: no new masses or lumps  HEENT: no vision loss, no tinnitus or hearing loss, no nose bleeding, no dysphagia, no odynophagia  CVS: no chest pain, no palpitations, no dyspnea on exertion  RESP: no shortness of breath, no hemoptysis, no cough  BREAST: no nipple discharge, no breast tenderness, no breast masses on self breast examination  GI: no nausea, no vomiting, no diarrhea, no constipation, no melena, no hematochezia, no hematemesis, no abdominal pain, no increase in abdominal girth  : no dysuria, no hematuria, no discharge  GYN: no abnormal vaginal bleeding, no dyspareunia, no vaginal discharge  INTEGUMENT: no rashes, no abnormal bruising, no nail pitting, no hyperpigmentation  NEURO: no falls, no memory loss, no paresthesias or dysesthesias, no urofecal incontinence or retention, no loss of strength on any extremity  MSK: no back pain, no new joint pain, no joint swelling  PSYCH: no suicidal or homicidal ideation, no depression, no insomnia, no anhedonia  ENDOCRINE: no heat or cold intolerance, no polyuria, no polydipsia    ECOG Performance Status:  "1   Objective:      Vitals:    09/07/23 1044   BP: (!) 180/77   BP Location: Left arm   Patient Position: Sitting   BP Method: Large (Automatic)   Pulse: 88   Resp: 18   Temp: 98 °F (36.7 °C)   TempSrc: Oral   SpO2: 95%   Weight: (!) 173.5 kg (382 lb 6.4 oz)   Height: 5' 2" (1.575 m)       BMI: Body mass index is 69.94 kg/m².    Physical Exam:  GA: AAOx3, NAD. Obese.   HEENT: NCAT, no oral ulcers  LYMPH: no cervical, axillary or supraclavicular region lymphadenopathy  CVS: s1s2 RRR, + murmur  RESP: CTA b/l, no crackles, no wheezes or rhonchi  ABD: soft, NT, ND, BS+, no hepatosplenomegaly  EXT: no deformities, mild b/l pitting pedal edema  SKIN: no rashes, no bruises or purpura, warm and dry  NEURO: normal mentation, strength 5/5 on all 4 extremities, no sensory deficits    Laboratory results:    09/01/2023 WBC count 3.6, hemoglobin 8.9, .1, platelet count 231.    08/17/2023 creatinine 1.46, LFTs within normal limits, TSH 1.7, iron 45, TIBC 284,% saturation 16, ferritin 96, folate 76, B12 598, , WBC count 3.7, hemoglobin 9.1, .4, haptoglobin 251, thyroglobulin less than 0.1, thyroglobulin antibody less than 1.0, copper 146, SPEP/Brenda  without M spike, kappa to lambda light chain ratio 2.59, retic count 1.9%, direct Mk negative,    07/12/2023 vitamin B12 587, folic acid more than 20, hemoglobin 8.0, , platelet count 232    06/12/2023 creatinine 1.64, LFTs within normal limits, albumin 4 point hemoglobin 8.3, ,  ferritin 130, iron saturation 23, iron 68, TIBC 296.     12/22/2022:  Creatinine 1.2, albumin 3.8, free T4 1.75, TSH 0.45, iron 70, sat% 24, ferritin 102, folate 42.7, B12 721, WBC 4.2, HGB 10.1, .0, platelets 226    9/20/21 Tg Ab <1 Tg <0.1      9/16/21 Cr 1.4 Alb 3.5 TP 8.1 FT4 1.44 AlkPhos 68 AST 8 ALT 15 TSH 0.43 A1C 5.9 Ferritin 132 Folate >20 WBC 5 RBC 3.05 Hg 9.3 MV 98.4  Retic 1.56% Vit D 33.5  6/16/21 Cr 1.4, TSH 1.04 FT4 1.47, thyroglobulin Ab " < 0.1, Folate > 20, Ferritin 118, B12 712, albumin urine, <12, Iron 75, TIBC 298, iron sat 25%, WBC 3.4, Hgb 9.4, MCV 99.4, , Vit D 40, retic 1.10%  3/19/21 folate > 20, B12 864, retic 1.90%, WBC 5.7, Hgb 9.3, MCV 97.4, , , urine albumin < 12.0, Vit D 45, EPO 13.5, Thyroglobulin Ab <1.0, Thyroglobulin <0.1, Cr 1.1, ferritin 100, ESR 88, TSH 1.57, FT4 3.08, hgb electrophoresis pending**  20  Vit D 40.6 CRP 1.7 urine albumin <12 Cholesterol 11 HLD 62 LDL 42 ESR 61 Iron 60 TIBC 315 A1C 5.8 WBC 4.8 Hg 8.6 .5  FT4 1.33 TSH 26.98 Folate 8.3 B12 880 ferritin 101 Cr 1.2 TP 8.2 Retic 2.11 Copper 151 SPEP w/ RIN no M spike Abs kappa 35.6 Abs lambda 15.4 SFLC ratio 2.31  20 TG ab <1 Tg 0.1  20 Cr 1.9 CO2 20.9 TSH 62.79 A1C 5.7 Alb 4 TP 8.4 AlkPhos 55 AST 15 ALT 9 FT4 0.76 WBC 4.9 RBC 2.99 Hg 9.7  RDW 14.5  FT3 1.1  20 TSH 81.49 Free T4 0.73    Path:        2023 path review of smear:  Microcytic anemia, mild leukopenia, adequate platelet count.    3/31/2004 Thyroid left lobectomy: multinodular thyroid parenchyma. Small hypercellular dominant nodule. No evidence of malignancy identified. No parathyroid glands identified.  3/8/2004 Right thyroid lobectomy: oncocytic carcinoma (Hurthle cell carcinoma), minimally invasive 3.8cm, capsular invasion present.    Imagin/15/21 MMG: BIRADS2  20 US thyroid: 10x8.2x6mm residual thyroid tissue in right thyroid bed  20 Screening MMG: BIRADS 2  19 CT chest w/o contrast: prominent solitary pulmonary cyst on right most compatible with benign bulla/bleb. 4.5x4.2x4cm  4/15/16 US thyroid: small amount of residual thyroid tissue on the right    Assessment:     # Macrocytic anemia D53.9 Pt reports a hx of KEY, however her labs show mild macrocytosis with normal PLT and WBC counts. Likely related to CKD  During her initial visit SPEP, RIN, SFLC normal, B12 normal, copper normal, Retic inappropriately  normal, folate deficient.  She was started on folic acid 1 mg q.day   More recently she was found to have worsening anemia with a hemoglobin on 8 grams/deciliter prompting a early a follow-up visit with us.    Repeat blood work in August 2023 with normal SPEP/Brenda, haptoglobin, Mk test, retic count, vitamin B12, folic acid level and free T4, TSH.  Iron panel consistent with anemia of chronic disease.        # History of thyroid cancer Z85.850 S/p left and right thyroid lobectomy 3/2004 with Hurthle cell carcinoma, minimally invasive  Pt reports that she received FLORES, but have no records available  Thyroid US showing some residual thyroid tissue 2016. Repeat US thyroid 11/2020 unchanged  TSH throughout 2020 has been significantly elevated,  9/2021 WNL 0.4  Tg and Tg Ab unremarkable  12/2019 CT chest without any evidence to suggest metastatic disease though solitary pulm cyst consistent with bleb/bulla was seen on right side.   12/30/2022 - TSH normal range. FT4 mild elevation.           Plan:       Comprehensive workup negative for hemolysis, nutritional deficiencies, or monoclonal plasma cell disorder.  Iron panel consistent with anemia of chronic disease   Thyroglobulin antibody, thyroglobulin, thyroid function within normal limits.  Discussed with patient the concern for anemia of chronic disease however can not completely rule out primary bone marrow disorders   Discussed the plan to keep a close eye on patient's hemoglobin level with plans to proceed with a bone marrow biopsy if she is found to have worsening anemia, new thrombocytopenia or worsening leukopenia   Plan to follow-up in 4 weeks with repeat blood work including CBC, CMP, path review of smear, Magana antigen to discuss further workup.    Patient was advised to reach out to us if she were to have new symptoms of chest pain, shortness of breath, decreased appetite or significant weight loss.    A total of  30 minutes were spent in review of records,  interpretation of test, coordination of care, discussion and counseling with the patient.        Portions of the record may have been created with voice recognition software. Occasional wrong-word or sound-a-like substitutions may have occurred due to the inherent limitations of voice recognition software. Read the chart carefully and recognize, using context, where substitutions have occurred.

## 2023-10-30 NOTE — PROGRESS NOTES
PATIENT: Sarah Majano  MRN: [id number]  DATE: 10/31/2023    Diagnosis: History of thyroid cancer.  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    Colonoscopy 2023  MMG 2021  DEXA 2019    Patient was following with us for a history of thyroid cancer however was referred again for a new diagnosis of worsening macrocytic anemia.        Subjective:   Interval History:  Today, 10/31/2023, patient denies any acute concerns today.  She denies any symptoms of chest pain, shortness of breath, easy bruising, bleeding, decreased appetite, weight loss, night sweats, fevers, chills or new lumps or bumps.  She denies any new medications, ER or hospital visits since last follow-up visit with us.    Past Medical History:   Diagnosis Date    Anemia     Diabetes mellitus     Hurthle cell carcinoma of thyroid 2004    Hypertension     Mixed hyperlipidemia     Thyroid disease        Past Surgical HIstory:   Past Surgical History:   Procedure Laterality Date     SECTION, CLASSIC      THYROIDECTOMY         Family History   Problem Relation Age of Onset    Hyperthyroidism Mother     Diabetes Mother     Heart disease Father      Social History:  reports that she has never smoked. She has never used smokeless tobacco. She reports that she does not drink alcohol and does not use drugs.    Allergies:  Review of patient's allergies indicates:  No Known Allergies    Medications:  Current Outpatient Medications   Medication Sig Dispense Refill    alendronate (FOSAMAX) 70 MG tablet Take 70 mg by mouth every 7 days.      allopurinoL (ZYLOPRIM) 100 MG tablet Take 100 mg by mouth once daily.      atorvastatin (LIPITOR) 40 MG tablet Take 40 mg by mouth.       cholecalciferol, vitamin D3, (VITAMIN D3) 50 mcg (2,000 unit) Cap capsule Vitamin D3   daily      folic acid (FOLVITE) 1 MG tablet Take 1 tablet (1,000 mcg total) by mouth once daily. 30 tablet 3    glimepiride (AMARYL) 1 MG tablet Take 1 mg by mouth.      levothyroxine (SYNTHROID, LEVOTHROID) 175 MCG tablet Take 1 tablet (175 mcg total) by mouth before breakfast. 30 tablet 5    lisinopriL-hydrochlorothiazide (PRINZIDE,ZESTORETIC) 20-12.5 mg per tablet Take 1 tablet by mouth.      metFORMIN (GLUCOPHAGE) 500 MG tablet Take 500 mg by mouth.      pioglitazone (ACTOS) 45 MG tablet Take 45 mg by mouth.       No current facility-administered medications for this visit.     Review of Systems:   CONSTITUTIONAL: no fevers, no chills,  weight gain, no fatigue, no weakness  HEMATOLOGIC: no abnormal bleeding, no abnormal bruising, no drenching night sweats  ONCOLOGIC: no new masses or lumps  HEENT: no vision loss, no tinnitus or hearing loss, no nose bleeding, no dysphagia, no odynophagia  CVS: no chest pain, no palpitations, no dyspnea on exertion  RESP: no shortness of breath, no hemoptysis, no cough  BREAST: no nipple discharge, no breast tenderness, no breast masses on self breast examination  GI: no nausea, no vomiting, no diarrhea, no constipation, no melena, no hematochezia, no hematemesis, no abdominal pain, no increase in abdominal girth  : no dysuria, no hematuria, no discharge  GYN: no abnormal vaginal bleeding, no dyspareunia, no vaginal discharge  INTEGUMENT: no rashes, no abnormal bruising, no nail pitting, no hyperpigmentation  NEURO: no falls, no memory loss, no paresthesias or dysesthesias, no urofecal incontinence or retention, no loss of strength on any extremity  MSK: no back pain, no new joint pain, no joint swelling  PSYCH: no suicidal or homicidal ideation, no depression, no insomnia, no anhedonia  ENDOCRINE: no heat or cold intolerance, no polyuria, no polydipsia    ECOG Performance Status: 1  "  Objective:      Vitals:    10/31/23 1113   BP: (!) 175/70   BP Location: Left arm   Patient Position: Sitting   BP Method: Medium (Automatic)   Pulse: 76   Resp: 20   Temp: 97.7 °F (36.5 °C)   TempSrc: Oral   SpO2: 99%   Weight: (!) 168.6 kg (371 lb 11.2 oz)   Height: 5' 2" (1.575 m)       BMI: Body mass index is 67.98 kg/m².    Physical Exam:  GA: AAOx3, NAD. Obese.   HEENT: NCAT, no oral ulcers  LYMPH: no cervical, axillary or supraclavicular region lymphadenopathy  CVS: s1s2 RRR, + murmur  RESP: CTA b/l, no crackles, no wheezes or rhonchi  ABD: soft, NT, ND, BS+, no hepatosplenomegaly  EXT: no deformities, mild b/l pitting pedal edema  SKIN: no rashes, no bruises or purpura, warm and dry  NEURO: normal mentation, strength 5/5 on all 4 extremities, no sensory deficits    Laboratory results:    10/17/23: CMP unremarkable, except cratinine 1.4, wbc 3.4, hgb 9.4, plt 237, Magana negative,  09/01/2023 WBC count 3.6, hemoglobin 8.9, .1, platelet count 231.    08/17/2023 creatinine 1.46, LFTs within normal limits, TSH 1.7, iron 45, TIBC 284,% saturation 16, ferritin 96, folate 76, B12 598, , WBC count 3.7, hemoglobin 9.1, .4, haptoglobin 251, thyroglobulin less than 0.1, thyroglobulin antibody less than 1.0, copper 146, SPEP/Brenda  without M spike, kappa to lambda light chain ratio 2.59, retic count 1.9%, direct Mk negative,    07/12/2023 vitamin B12 587, folic acid more than 20, hemoglobin 8.0, , platelet count 232    06/12/2023 creatinine 1.64, LFTs within normal limits, albumin 4 point hemoglobin 8.3, ,  ferritin 130, iron saturation 23, iron 68, TIBC 296.     12/22/2022:  Creatinine 1.2, albumin 3.8, free T4 1.75, TSH 0.45, iron 70, sat% 24, ferritin 102, folate 42.7, B12 721, WBC 4.2, HGB 10.1, .0, platelets 226    9/20/21 Tg Ab <1 Tg <0.1      9/16/21 Cr 1.4 Alb 3.5 TP 8.1 FT4 1.44 AlkPhos 68 AST 8 ALT 15 TSH 0.43 A1C 5.9 Ferritin 132 Folate >20 WBC 5 RBC 3.05 Hg " 9.3 MV 98.4  Retic 1.56% Vit D 33.5  21 Cr 1.4, TSH 1.04 FT4 1.47, thyroglobulin Ab < 0.1, Folate > 20, Ferritin 118, B12 712, albumin urine, <12, Iron 75, TIBC 298, iron sat 25%, WBC 3.4, Hgb 9.4, MCV 99.4, , Vit D 40, retic 1.10%  3/19/21 folate > 20, B12 864, retic 1.90%, WBC 5.7, Hgb 9.3, MCV 97.4, , , urine albumin < 12.0, Vit D 45, EPO 13.5, Thyroglobulin Ab <1.0, Thyroglobulin <0.1, Cr 1.1, ferritin 100, ESR 88, TSH 1.57, FT4 3.08, hgb electrophoresis pending**  20  Vit D 40.6 CRP 1.7 urine albumin <12 Cholesterol 11 HLD 62 LDL 42 ESR 61 Iron 60 TIBC 315 A1C 5.8 WBC 4.8 Hg 8.6 .5  FT4 1.33 TSH 26.98 Folate 8.3 B12 880 ferritin 101 Cr 1.2 TP 8.2 Retic 2.11 Copper 151 SPEP w/ RIN no M spike Abs kappa 35.6 Abs lambda 15.4 SFLC ratio 2.31  20 TG ab <1 Tg 0.1  20 Cr 1.9 CO2 20.9 TSH 62.79 A1C 5.7 Alb 4 TP 8.4 AlkPhos 55 AST 15 ALT 9 FT4 0.76 WBC 4.9 RBC 2.99 Hg 9.7  RDW 14.5  FT3 1.1  20 TSH 81.49 Free T4 0.73    Path:  10/17/23 Pat review of smear: Leukocytopenia with essentially normal differential, moderate macrocytic/hypochromic anemia, platelet population adequate, No circulation blasts identified  2023 path review of smear:  Microcytic anemia, mild leukopenia, adequate platelet count.    3/31/2004 Thyroid left lobectomy: multinodular thyroid parenchyma. Small hypercellular dominant nodule. No evidence of malignancy identified. No parathyroid glands identified.  3/8/2004 Right thyroid lobectomy: oncocytic carcinoma (Hurthle cell carcinoma), minimally invasive 3.8cm, capsular invasion present.    Imagin/15/21 MMG: BIRADS2  20 US thyroid: 10x8.2x6mm residual thyroid tissue in right thyroid bed  20 Screening MMG: BIRADS 2  19 CT chest w/o contrast: prominent solitary pulmonary cyst on right most compatible with benign bulla/bleb. 4.5x4.2x4cm  4/15/16 US thyroid: small amount of residual thyroid  tissue on the right    Assessment:     # Macrocytic anemia D53.9 Pt reports a hx of KEY, however her labs show mild macrocytosis with normal PLT and WBC counts. Likely related to CKD  During her initial visit SPEP, RIN, SFLC normal, B12 normal, copper normal, Retic inappropriately normal, folate deficient.  She was started on folic acid 1 mg q.day   More recently she was found to have worsening anemia with a hemoglobin on 8 grams/deciliter prompting a early a follow-up visit with us.    Repeat blood work in August 2023 with normal SPEP/Rin, haptoglobin, Mk test, retic count, vitamin B12, folic acid level and free T4, TSH.  Iron panel consistent with anemia of chronic disease.  Discussed the plan to keep a close eye on patient's hemoglobin level with plans to proceed with a bone marrow biopsy if she is found to have worsening anemia, new thrombocytopenia or worsening leukopenia         # History of thyroid cancer Z85.850 S/p left and right thyroid lobectomy 3/2004 with Hurthle cell carcinoma, minimally invasive  Pt reports that she received FLORES, but have no records available  Thyroid US showing some residual thyroid tissue 2016. Repeat US thyroid 11/2020 unchanged  TSH throughout 2020 has been significantly elevated,  9/2021 WNL 0.4  Tg and Tg Ab unremarkable  12/2019 CT chest without any evidence to suggest metastatic disease though solitary pulm cyst consistent with bleb/bulla was seen on right side.   12/30/2022 - TSH normal range. FT4 mild elevation.           Plan:     Plan  Follow-up in 4 months with repeat blood work including CBC, CMP  Patient was advised to reach out to us if she were to have new symptoms of chest pain, shortness of breath, decreased appetite or significant weight loss.    A total of  30 minutes were spent in review of records, interpretation of test, coordination of care, discussion and counseling with the patient.

## 2023-10-31 ENCOUNTER — OFFICE VISIT (OUTPATIENT)
Dept: HEMATOLOGY/ONCOLOGY | Facility: CLINIC | Age: 63
End: 2023-10-31
Payer: COMMERCIAL

## 2023-10-31 VITALS
WEIGHT: 293 LBS | OXYGEN SATURATION: 99 % | DIASTOLIC BLOOD PRESSURE: 70 MMHG | TEMPERATURE: 98 F | RESPIRATION RATE: 20 BRPM | HEIGHT: 62 IN | SYSTOLIC BLOOD PRESSURE: 175 MMHG | BODY MASS INDEX: 53.92 KG/M2 | HEART RATE: 76 BPM

## 2023-10-31 DIAGNOSIS — D53.9 MACROCYTIC ANEMIA: ICD-10-CM

## 2023-10-31 DIAGNOSIS — C73 THYROID CANCER: Primary | ICD-10-CM

## 2023-10-31 PROCEDURE — 4010F ACE/ARB THERAPY RXD/TAKEN: CPT | Mod: CPTII,,,

## 2023-10-31 PROCEDURE — 3008F BODY MASS INDEX DOCD: CPT | Mod: CPTII,,,

## 2023-10-31 PROCEDURE — 3077F SYST BP >= 140 MM HG: CPT | Mod: CPTII,,,

## 2023-10-31 PROCEDURE — 3008F PR BODY MASS INDEX (BMI) DOCUMENTED: ICD-10-PCS | Mod: CPTII,,,

## 2023-10-31 PROCEDURE — 3078F DIAST BP <80 MM HG: CPT | Mod: CPTII,,,

## 2023-10-31 PROCEDURE — 3078F PR MOST RECENT DIASTOLIC BLOOD PRESSURE < 80 MM HG: ICD-10-PCS | Mod: CPTII,,,

## 2023-10-31 PROCEDURE — 99214 OFFICE O/P EST MOD 30 MIN: CPT | Mod: ,,,

## 2023-10-31 PROCEDURE — 3077F PR MOST RECENT SYSTOLIC BLOOD PRESSURE >= 140 MM HG: ICD-10-PCS | Mod: CPTII,,,

## 2023-10-31 PROCEDURE — 1159F MED LIST DOCD IN RCRD: CPT | Mod: CPTII,,,

## 2023-10-31 PROCEDURE — 4010F PR ACE/ARB THEARPY RXD/TAKEN: ICD-10-PCS | Mod: CPTII,,,

## 2023-10-31 PROCEDURE — 99214 PR OFFICE/OUTPT VISIT, EST, LEVL IV, 30-39 MIN: ICD-10-PCS | Mod: ,,,

## 2023-10-31 PROCEDURE — 1159F PR MEDICATION LIST DOCUMENTED IN MEDICAL RECORD: ICD-10-PCS | Mod: CPTII,,,

## 2023-11-10 DIAGNOSIS — C73 THYROID CANCER: ICD-10-CM

## 2023-11-13 RX ORDER — FOLIC ACID 1 MG/1
1000 TABLET ORAL
Qty: 30 TABLET | Refills: 0 | Status: SHIPPED | OUTPATIENT
Start: 2023-11-13

## 2024-03-04 ENCOUNTER — OFFICE VISIT (OUTPATIENT)
Dept: HEMATOLOGY/ONCOLOGY | Facility: CLINIC | Age: 64
End: 2024-03-04
Payer: COMMERCIAL

## 2024-03-04 VITALS
TEMPERATURE: 98 F | RESPIRATION RATE: 20 BRPM | HEART RATE: 83 BPM | DIASTOLIC BLOOD PRESSURE: 68 MMHG | BODY MASS INDEX: 53.92 KG/M2 | SYSTOLIC BLOOD PRESSURE: 141 MMHG | OXYGEN SATURATION: 99 % | HEIGHT: 62 IN | WEIGHT: 293 LBS

## 2024-03-04 DIAGNOSIS — D53.9 MACROCYTIC ANEMIA: Primary | ICD-10-CM

## 2024-03-04 DIAGNOSIS — C73 THYROID CANCER: ICD-10-CM

## 2024-03-04 PROCEDURE — 3078F DIAST BP <80 MM HG: CPT | Mod: CPTII,,, | Performed by: NURSE PRACTITIONER

## 2024-03-04 PROCEDURE — 99213 OFFICE O/P EST LOW 20 MIN: CPT | Mod: ,,, | Performed by: NURSE PRACTITIONER

## 2024-03-04 PROCEDURE — 1160F RVW MEDS BY RX/DR IN RCRD: CPT | Mod: CPTII,,, | Performed by: NURSE PRACTITIONER

## 2024-03-04 PROCEDURE — 3077F SYST BP >= 140 MM HG: CPT | Mod: CPTII,,, | Performed by: NURSE PRACTITIONER

## 2024-03-04 PROCEDURE — 1159F MED LIST DOCD IN RCRD: CPT | Mod: CPTII,,, | Performed by: NURSE PRACTITIONER

## 2024-03-04 PROCEDURE — 3008F BODY MASS INDEX DOCD: CPT | Mod: CPTII,,, | Performed by: NURSE PRACTITIONER

## 2024-03-04 PROCEDURE — 4010F ACE/ARB THERAPY RXD/TAKEN: CPT | Mod: CPTII,,, | Performed by: NURSE PRACTITIONER

## 2024-03-04 RX ORDER — EZETIMIBE 10 MG/1
10 TABLET ORAL DAILY
COMMUNITY
Start: 2023-12-11

## 2024-03-04 NOTE — PROGRESS NOTES
PATIENT: Sarah Majano  MRN: [id number]  DATE: 3/4/2024    Diagnosis: History of thyroid cancer.  Anemia    Chief Complaint:  Here for my follow-up and labs.        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    Colonoscopy 2023  MMG 2021  DEXA 2019    Patient was following with us for a history of thyroid cancer however was referred again for a new diagnosis of worsening macrocytic anemia.    Subjective:   Interval History:    3/4/2024:  Ms. Majano is here today for follow-up and labs for her thyroid cancer and macrocytic anemia.  Today, she reports feeling well.  She continues on Folic Acid 1 mg PO QD and levothyroxine 175 mcg PO QD.  She denies any headaches, dizziness, lightheadedness, fatigue, SOB, chest palpitations, chest pain, PICA, leg cramps, numbness and tingling of the extremities.  She denies any epistaxis, hemoptysis, hematuria, and melena.  She denies any abdominal discomfort, change in bowel or bladder habits, confirms bilateral knee discomfort, and no peripheral neuropathy.  She will see Dr. Austin in May or  for elevation of chronic kidney disease.  Labs reviewed with patient dated 2024:  TSH 4.37, creatinine 1.32, eGFR 45, liver enzymes WNL, T Bili WNL, WBC 4.2, Hgb 8.9, Hct 29.4, , Platelets 353,000.  Eating and drinking well.   No recent hospitalizations, infections, or illnesses.        Past Medical History:   Diagnosis Date    Anemia     Diabetes mellitus     Hurthle cell carcinoma of thyroid 2004    Hypertension     Mixed hyperlipidemia     Thyroid disease        Past Surgical HIstory:   Past Surgical History:   Procedure Laterality Date     SECTION, CLASSIC      THYROIDECTOMY         Family History   Problem Relation Age of Onset    Hyperthyroidism Mother      Diabetes Mother     Heart disease Father      Social History:  reports that she has never smoked. She has never used smokeless tobacco. She reports that she does not drink alcohol and does not use drugs.    Allergies:  Review of patient's allergies indicates:  No Known Allergies    Medications:  Current Outpatient Medications   Medication Sig Dispense Refill    alendronate (FOSAMAX) 70 MG tablet Take 70 mg by mouth every 7 days.      allopurinoL (ZYLOPRIM) 100 MG tablet Take 100 mg by mouth once daily.      atorvastatin (LIPITOR) 40 MG tablet Take 40 mg by mouth.      cholecalciferol, vitamin D3, (VITAMIN D3) 50 mcg (2,000 unit) Cap capsule Vitamin D3   daily      ezetimibe (ZETIA) 10 mg tablet Take 10 mg by mouth once daily.      folic acid (FOLVITE) 1 MG tablet Take 1 tablet by mouth once daily 30 tablet 0    glimepiride (AMARYL) 1 MG tablet Take 1 mg by mouth.      lisinopriL-hydrochlorothiazide (PRINZIDE,ZESTORETIC) 20-12.5 mg per tablet Take 1 tablet by mouth.      metFORMIN (GLUCOPHAGE) 500 MG tablet Take 500 mg by mouth.      pioglitazone (ACTOS) 45 MG tablet Take 45 mg by mouth.      levothyroxine (SYNTHROID, LEVOTHROID) 175 MCG tablet Take 1 tablet (175 mcg total) by mouth before breakfast. 30 tablet 5     No current facility-administered medications for this visit.     Review of Systems:   CONSTITUTIONAL: no fevers, no chills,  weight gain, no fatigue, no weakness  HEMATOLOGIC: no abnormal bleeding, no abnormal bruising, no drenching night sweats  ONCOLOGIC: no new masses or lumps  HEENT: no vision loss, no tinnitus or hearing loss, no nose bleeding, no dysphagia, no odynophagia  CVS: no chest pain, no palpitations, no dyspnea on exertion  RESP: no shortness of breath, no hemoptysis, no cough  BREAST: no nipple discharge, no breast tenderness, no breast masses on self breast examination  GI: no nausea, no vomiting, no diarrhea, no constipation, no melena, no hematochezia, no hematemesis, no abdominal pain,  "no increase in abdominal girth  : no dysuria, no hematuria, no discharge  GYN: no abnormal vaginal bleeding, no dyspareunia, no vaginal discharge  INTEGUMENT: no rashes, no abnormal bruising, no nail pitting, no hyperpigmentation  NEURO: no falls, no memory loss, no paresthesias or dysesthesias, no urofecal incontinence or retention, no loss of strength on any extremity  MSK: no back pain, no new joint pain, no joint swelling  PSYCH: no suicidal or homicidal ideation, no depression, no insomnia, no anhedonia  ENDOCRINE: no heat or cold intolerance, no polyuria, no polydipsia    ECOG Performance Status: 1   Objective:      Vitals:    03/04/24 1407   BP: (!) 141/68   BP Location: Left arm   Patient Position: Sitting   BP Method: Medium (Automatic)   Pulse: 83   Resp: 20   Temp: 98 °F (36.7 °C)   TempSrc: Oral   SpO2: 99%   Weight: (!) 162.4 kg (358 lb)   Height: 5' 2" (1.575 m)         BMI: Body mass index is 65.48 kg/m².    Physical Exam:  GA: AAOx3, NAD. Obese.   HEENT: NCAT, no oral ulcers  LYMPH: no cervical, axillary or supraclavicular region lymphadenopathy  CVS: s1s2 RRR, + murmur  RESP: CTA b/l, no crackles, no wheezes or rhonchi  ABD: soft, NT, ND, BS+, no hepatosplenomegaly  EXT: no deformities, mild b/l pitting pedal edema  SKIN: no rashes, no bruises or purpura, warm and dry  NEURO: normal mentation, strength 5/5 on all 4 extremities, no sensory deficits    Laboratory results:  2/28/2024:  TSH 4.37, creatinine 1.32, eGFR 45, liver enzymes WNL, T Bili WNL, WBC 4.2, Hgb 8.9, Hct 29.4, , Platelets 353,000.    10/17/23: CMP unremarkable, except cratinine 1.4, wbc 3.4, hgb 9.4, plt 237, Magana negative,  09/01/2023 WBC count 3.6, hemoglobin 8.9, .1, platelet count 231.    08/17/2023 creatinine 1.46, LFTs within normal limits, TSH 1.7, iron 45, TIBC 284,% saturation 16, ferritin 96, folate 76, B12 598, , WBC count 3.7, hemoglobin 9.1, .4, haptoglobin 251, thyroglobulin less than 0.1, " thyroglobulin antibody less than 1.0, copper 146, SPEP/Rin  without M spike, kappa to lambda light chain ratio 2.59, retic count 1.9%, direct Mk negative,    07/12/2023 vitamin B12 587, folic acid more than 20, hemoglobin 8.0, , platelet count 232    06/12/2023 creatinine 1.64, LFTs within normal limits, albumin 4 point hemoglobin 8.3, ,  ferritin 130, iron saturation 23, iron 68, TIBC 296.     12/22/2022:  Creatinine 1.2, albumin 3.8, free T4 1.75, TSH 0.45, iron 70, sat% 24, ferritin 102, folate 42.7, B12 721, WBC 4.2, HGB 10.1, .0, platelets 226    9/20/21 Tg Ab <1 Tg <0.1      9/16/21 Cr 1.4 Alb 3.5 TP 8.1 FT4 1.44 AlkPhos 68 AST 8 ALT 15 TSH 0.43 A1C 5.9 Ferritin 132 Folate >20 WBC 5 RBC 3.05 Hg 9.3 MV 98.4  Retic 1.56% Vit D 33.5  6/16/21 Cr 1.4, TSH 1.04 FT4 1.47, thyroglobulin Ab < 0.1, Folate > 20, Ferritin 118, B12 712, albumin urine, <12, Iron 75, TIBC 298, iron sat 25%, WBC 3.4, Hgb 9.4, MCV 99.4, , Vit D 40, retic 1.10%  3/19/21 folate > 20, B12 864, retic 1.90%, WBC 5.7, Hgb 9.3, MCV 97.4, , , urine albumin < 12.0, Vit D 45, EPO 13.5, Thyroglobulin Ab <1.0, Thyroglobulin <0.1, Cr 1.1, ferritin 100, ESR 88, TSH 1.57, FT4 3.08, hgb electrophoresis pending**  12/16/20  Vit D 40.6 CRP 1.7 urine albumin <12 Cholesterol 11 HLD 62 LDL 42 ESR 61 Iron 60 TIBC 315 A1C 5.8 WBC 4.8 Hg 8.6 .5  FT4 1.33 TSH 26.98 Folate 8.3 B12 880 ferritin 101 Cr 1.2 TP 8.2 Retic 2.11 Copper 151 SPEP w/ RIN no M spike Abs kappa 35.6 Abs lambda 15.4 SFLC ratio 2.31  12/16/20 TG ab <1 Tg 0.1  9/8/20 Cr 1.9 CO2 20.9 TSH 62.79 A1C 5.7 Alb 4 TP 8.4 AlkPhos 55 AST 15 ALT 9 FT4 0.76 WBC 4.9 RBC 2.99 Hg 9.7  RDW 14.5  FT3 1.1  5/29/20 TSH 81.49 Free T4 0.73    Path:  10/17/23 Pat review of smear: Leukocytopenia with essentially normal differential, moderate macrocytic/hypochromic anemia, platelet population adequate, No circulation blasts  identified  2023 path review of smear:  Microcytic anemia, mild leukopenia, adequate platelet count.    3/31/2004 Thyroid left lobectomy: multinodular thyroid parenchyma. Small hypercellular dominant nodule. No evidence of malignancy identified. No parathyroid glands identified.  3/8/2004 Right thyroid lobectomy: oncocytic carcinoma (Hurthle cell carcinoma), minimally invasive 3.8cm, capsular invasion present.    Imagin/15/21 MMG: BIRADS2  20 US thyroid: 10x8.2x6mm residual thyroid tissue in right thyroid bed  20 Screening MMG: BIRADS 2  19 CT chest w/o contrast: prominent solitary pulmonary cyst on right most compatible with benign bulla/bleb. 4.5x4.2x4cm  4/15/16 US thyroid: small amount of residual thyroid tissue on the right    Assessment:     # Macrocytic anemia D53.9 Pt reports a hx of KEY, however her labs show mild macrocytosis with normal PLT and WBC counts. Likely related to CKD  During her initial visit SPEP, RIN, SFLC normal, B12 normal, copper normal, Retic inappropriately normal, folate deficient.  She was started on folic acid 1 mg q.day   More recently she was found to have worsening anemia with a hemoglobin on 8 grams/deciliter prompting a early a follow-up visit with us.    Repeat blood work in 2023 with normal SPEP/Rin, haptoglobin, Mk test, retic count, vitamin B12, folic acid level and free T4, TSH.  Iron panel consistent with anemia of chronic disease.  Discussed the plan to keep a close eye on patient's hemoglobin level with plans to proceed with a bone marrow biopsy if she is found to have worsening anemia, new thrombocytopenia or worsening leukopenia         # History of thyroid cancer Z85.850 S/p left and right thyroid lobectomy 3/2004 with Hurthle cell carcinoma, minimally invasive  Pt reports that she received FLORES, but have no records available  Thyroid US showing some residual thyroid tissue . Repeat US thyroid 2020 unchanged  TSH throughout  2020 has been significantly elevated,  9/2021 WNL 0.4  Tg and Tg Ab unremarkable  12/2019 CT chest without any evidence to suggest metastatic disease though solitary pulm cyst consistent with bleb/bulla was seen on right side.   12/30/2022 - TSH normal range. FT4 mild elevation.    3/4/2024 Assessment:  Patient doing well.  Labs are unchanged from last visit.  Will see Dr. Austin for chronic kidney disease in May or June of this year.  Compliant with Folic acid and Levothyroxine.         Plan:   3/4/2024  Plan  Continue to follow with PCP for wellness visits.  Follow through with upcoming visit with Dr. Austin for chronic kidney disease.  Patient will return in 3 months for office visit with Dr. Molina with labs:  CMP, CBC with diff, folate, and B12.    The patient is in agreement with today's plan of care.  All questions answered.  Patient is aware to contact our office for any problems or concerns prior to next visit.      Candida Carter, MSN-ED, APRN, AGACNP-BC, OCN

## 2024-05-20 DIAGNOSIS — M81.0 AGE-RELATED OSTEOPOROSIS WITHOUT CURRENT PATHOLOGICAL FRACTURE: Primary | ICD-10-CM

## 2024-06-11 ENCOUNTER — HOSPITAL ENCOUNTER (OUTPATIENT)
Dept: RADIOLOGY | Facility: HOSPITAL | Age: 64
Discharge: HOME | End: 2024-06-11
Attending: INTERNAL MEDICINE
Payer: COMMERCIAL

## 2024-06-11 DIAGNOSIS — M81.0 AGE-RELATED OSTEOPOROSIS WITHOUT CURRENT PATHOLOGICAL FRACTURE: ICD-10-CM

## 2024-06-11 PROCEDURE — 77080 DXA BONE DENSITY AXIAL: CPT | Mod: TC

## 2024-06-11 PROCEDURE — 77080 DXA BONE DENSITY AXIAL: CPT | Mod: 26,,, | Performed by: RADIOLOGY

## 2024-07-11 ENCOUNTER — OFFICE VISIT (OUTPATIENT)
Dept: HEMATOLOGY/ONCOLOGY | Facility: CLINIC | Age: 64
End: 2024-07-11
Payer: COMMERCIAL

## 2024-07-11 VITALS
OXYGEN SATURATION: 98 % | WEIGHT: 293 LBS | BODY MASS INDEX: 53.92 KG/M2 | SYSTOLIC BLOOD PRESSURE: 184 MMHG | HEART RATE: 61 BPM | HEIGHT: 62 IN | RESPIRATION RATE: 14 BRPM | DIASTOLIC BLOOD PRESSURE: 77 MMHG

## 2024-07-11 DIAGNOSIS — C73 THYROID CANCER: Primary | ICD-10-CM

## 2024-07-11 DIAGNOSIS — D53.9 MACROCYTIC ANEMIA: ICD-10-CM

## 2024-07-11 PROCEDURE — 1159F MED LIST DOCD IN RCRD: CPT | Mod: CPTII,,, | Performed by: STUDENT IN AN ORGANIZED HEALTH CARE EDUCATION/TRAINING PROGRAM

## 2024-07-11 PROCEDURE — 3008F BODY MASS INDEX DOCD: CPT | Mod: CPTII,,, | Performed by: STUDENT IN AN ORGANIZED HEALTH CARE EDUCATION/TRAINING PROGRAM

## 2024-07-11 PROCEDURE — 99213 OFFICE O/P EST LOW 20 MIN: CPT | Mod: ,,, | Performed by: STUDENT IN AN ORGANIZED HEALTH CARE EDUCATION/TRAINING PROGRAM

## 2024-07-11 PROCEDURE — 3077F SYST BP >= 140 MM HG: CPT | Mod: CPTII,,, | Performed by: STUDENT IN AN ORGANIZED HEALTH CARE EDUCATION/TRAINING PROGRAM

## 2024-07-11 PROCEDURE — 4010F ACE/ARB THERAPY RXD/TAKEN: CPT | Mod: CPTII,,, | Performed by: STUDENT IN AN ORGANIZED HEALTH CARE EDUCATION/TRAINING PROGRAM

## 2024-07-11 PROCEDURE — 3078F DIAST BP <80 MM HG: CPT | Mod: CPTII,,, | Performed by: STUDENT IN AN ORGANIZED HEALTH CARE EDUCATION/TRAINING PROGRAM

## 2024-07-11 NOTE — PROGRESS NOTES
PATIENT: Sarah Majano  MRN: [id number]  DATE: 2024    Diagnosis: History of thyroid cancer.  Anemia    Chief Complaint:    Chief Complaint   Patient presents with    Follow-up     Patient reports no new concerns today.              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    Colonoscopy 2023  MMG 2021  DEXA 2019    Patient was following with us for a history of thyroid cancer however was referred again for a new diagnosis of worsening macrocytic anemia.    Subjective:   Interval History:    Today, 2024, patient denies any acute concerns today.  She denies any fevers, chills, night sweats, decreased appetite or weight loss.  She denies any new medications, ER or hospital visits since last follow-up visit with us.    Past Medical History:   Diagnosis Date    Anemia     Diabetes mellitus     Hurthle cell carcinoma of thyroid 2004    Hypertension     Mixed hyperlipidemia     Thyroid disease        Past Surgical HIstory:   Past Surgical History:   Procedure Laterality Date     SECTION, CLASSIC      THYROIDECTOMY         Family History   Problem Relation Name Age of Onset    Hyperthyroidism Mother      Diabetes Mother      Heart disease Father       Social History:  reports that she has never smoked. She has never used smokeless tobacco. She reports that she does not drink alcohol and does not use drugs.    Allergies:  Review of patient's allergies indicates:  No Known Allergies    Medications:  Current Outpatient Medications   Medication Sig Dispense Refill    alendronate (FOSAMAX) 70 MG tablet Take 70 mg by mouth every 7 days.      allopurinoL (ZYLOPRIM) 100 MG tablet Take 100 mg by mouth once daily.      atorvastatin (LIPITOR) 40 MG tablet Take 40 mg by mouth.      cholecalciferol, vitamin D3,  (VITAMIN D3) 50 mcg (2,000 unit) Cap capsule Vitamin D3   daily      ezetimibe (ZETIA) 10 mg tablet Take 10 mg by mouth once daily.      folic acid (FOLVITE) 1 MG tablet Take 1 tablet by mouth once daily 30 tablet 0    glimepiride (AMARYL) 1 MG tablet Take 1 mg by mouth.      lisinopriL-hydrochlorothiazide (PRINZIDE,ZESTORETIC) 20-12.5 mg per tablet Take 1 tablet by mouth.      metFORMIN (GLUCOPHAGE) 500 MG tablet Take 500 mg by mouth.      pioglitazone (ACTOS) 45 MG tablet Take 45 mg by mouth.      levothyroxine (SYNTHROID, LEVOTHROID) 175 MCG tablet Take 1 tablet (175 mcg total) by mouth before breakfast. 30 tablet 5     No current facility-administered medications for this visit.     Review of Systems:   CONSTITUTIONAL: no fevers, no chills,  weight gain, no fatigue, no weakness  HEMATOLOGIC: no abnormal bleeding, no abnormal bruising, no drenching night sweats  ONCOLOGIC: no new masses or lumps  HEENT: no vision loss, no tinnitus or hearing loss, no nose bleeding, no dysphagia, no odynophagia  CVS: no chest pain, no palpitations, no dyspnea on exertion  RESP: no shortness of breath, no hemoptysis, no cough  BREAST: no nipple discharge, no breast tenderness, no breast masses on self breast examination  GI: no nausea, no vomiting, no diarrhea, no constipation, no melena, no hematochezia, no hematemesis, no abdominal pain, no increase in abdominal girth  : no dysuria, no hematuria, no discharge  GYN: no abnormal vaginal bleeding, no dyspareunia, no vaginal discharge  INTEGUMENT: no rashes, no abnormal bruising, no nail pitting, no hyperpigmentation  NEURO: no falls, no memory loss, no paresthesias or dysesthesias, no urofecal incontinence or retention, no loss of strength on any extremity  MSK: no back pain, no new joint pain, no joint swelling  PSYCH: no suicidal or homicidal ideation, no depression, no insomnia, no anhedonia  ENDOCRINE: no heat or cold intolerance, no polyuria, no polydipsia    ECOG Performance  "Status: 1   Objective:      Vitals:    07/11/24 1412   BP: (!) 184/77   BP Location: Right arm   Patient Position: Sitting   Pulse: 61   Resp: 14   SpO2: 98%   Weight: (!) 163.5 kg (360 lb 6.4 oz)   Height: 5' 2" (1.575 m)           BMI: Body mass index is 65.92 kg/m².    Physical Exam:  GA: AAOx3, NAD. Obese.   HEENT: NCAT, no oral ulcers  LYMPH: no cervical, axillary or supraclavicular region lymphadenopathy  CVS: s1s2 RRR, + murmur  RESP: CTA b/l, no crackles, no wheezes or rhonchi  ABD: soft, NT, ND, BS+, no hepatosplenomegaly  EXT: no deformities, mild b/l pitting pedal edema  SKIN: no rashes, no bruises or purpura, warm and dry  NEURO: normal mentation, strength 5/5 on all 4 extremities, no sensory deficits    Laboratory results:    07/01/2024 creatinine 1.2, LFTs unremarkable, iron 55, TIBC 309,% saturation 18, ferritin 177, folic acid 15.9, B12 993, WBC count 3.01, hemoglobin 9.1, MCV 97.3, platelet count 223, ANC 1.37      2/28/2024:  TSH 4.37, creatinine 1.32, eGFR 45, liver enzymes WNL, T Bili WNL, WBC 4.2, Hgb 8.9, Hct 29.4, , Platelets 353,000.    10/17/23: CMP unremarkable, except cratinine 1.4, wbc 3.4, hgb 9.4, plt 237, Magana negative,  09/01/2023 WBC count 3.6, hemoglobin 8.9, .1, platelet count 231.    08/17/2023 creatinine 1.46, LFTs within normal limits, TSH 1.7, iron 45, TIBC 284,% saturation 16, ferritin 96, folate 76, B12 598, , WBC count 3.7, hemoglobin 9.1, .4, haptoglobin 251, thyroglobulin less than 0.1, thyroglobulin antibody less than 1.0, copper 146, SPEP/Brenda  without M spike, kappa to lambda light chain ratio 2.59, retic count 1.9%, direct Mk negative,    07/12/2023 vitamin B12 587, folic acid more than 20, hemoglobin 8.0, , platelet count 232    06/12/2023 creatinine 1.64, LFTs within normal limits, albumin 4 point hemoglobin 8.3, ,  ferritin 130, iron saturation 23, iron 68, TIBC 296.     12/22/2022:  Creatinine 1.2, albumin 3.8, " free T4 1.75, TSH 0.45, iron 70, sat% 24, ferritin 102, folate 42.7, B12 721, WBC 4.2, HGB 10.1, .0, platelets 226    21 Tg Ab <1 Tg <0.1      21 Cr 1.4 Alb 3.5 TP 8.1 FT4 1.44 AlkPhos 68 AST 8 ALT 15 TSH 0.43 A1C 5.9 Ferritin 132 Folate >20 WBC 5 RBC 3.05 Hg 9.3 MV 98.4  Retic 1.56% Vit D 33.5  21 Cr 1.4, TSH 1.04 FT4 1.47, thyroglobulin Ab < 0.1, Folate > 20, Ferritin 118, B12 712, albumin urine, <12, Iron 75, TIBC 298, iron sat 25%, WBC 3.4, Hgb 9.4, MCV 99.4, , Vit D 40, retic 1.10%  3/19/21 folate > 20, B12 864, retic 1.90%, WBC 5.7, Hgb 9.3, MCV 97.4, , , urine albumin < 12.0, Vit D 45, EPO 13.5, Thyroglobulin Ab <1.0, Thyroglobulin <0.1, Cr 1.1, ferritin 100, ESR 88, TSH 1.57, FT4 3.08, hgb electrophoresis pending**  20  Vit D 40.6 CRP 1.7 urine albumin <12 Cholesterol 11 HLD 62 LDL 42 ESR 61 Iron 60 TIBC 315 A1C 5.8 WBC 4.8 Hg 8.6 .5  FT4 1.33 TSH 26.98 Folate 8.3 B12 880 ferritin 101 Cr 1.2 TP 8.2 Retic 2.11 Copper 151 SPEP w/ RIN no M spike Abs kappa 35.6 Abs lambda 15.4 SFLC ratio 2.31  20 TG ab <1 Tg 0.1  20 Cr 1.9 CO2 20.9 TSH 62.79 A1C 5.7 Alb 4 TP 8.4 AlkPhos 55 AST 15 ALT 9 FT4 0.76 WBC 4.9 RBC 2.99 Hg 9.7  RDW 14.5  FT3 1.1  20 TSH 81.49 Free T4 0.73    Path:  10/17/23 Pat review of smear: Leukocytopenia with essentially normal differential, moderate macrocytic/hypochromic anemia, platelet population adequate, No circulation blasts identified  2023 path review of smear:  Microcytic anemia, mild leukopenia, adequate platelet count.    3/31/2004 Thyroid left lobectomy: multinodular thyroid parenchyma. Small hypercellular dominant nodule. No evidence of malignancy identified. No parathyroid glands identified.  3/8/2004 Right thyroid lobectomy: oncocytic carcinoma (Hurthle cell carcinoma), minimally invasive 3.8cm, capsular invasion present.    Imagin/15/21 MMG: BIRADS2  20 US  thyroid: 10x8.2x6mm residual thyroid tissue in right thyroid bed  5/27/20 Screening MMG: BIRADS 2  12/6/19 CT chest w/o contrast: prominent solitary pulmonary cyst on right most compatible with benign bulla/bleb. 4.5x4.2x4cm  4/15/16 US thyroid: small amount of residual thyroid tissue on the right    Assessment:     # Macrocytic anemia D53.9 Pt reports a hx of KEY, however her labs show mild macrocytosis with normal PLT and WBC counts. Likely related to CKD  During her initial visit SPEP, RIN, SFLC normal, B12 normal, copper normal, Retic inappropriately normal, folate deficient.  She was started on folic acid 1 mg q.day   More recently she was found to have worsening anemia with a hemoglobin on 8 grams/deciliter prompting a early a follow-up visit with us.    Repeat blood work in August 2023 with normal SPEP/Rin, haptoglobin, Mk test, retic count, vitamin B12, folic acid level and free T4, TSH.  Iron panel consistent with anemia of chronic disease.  Discussed the plan to keep a close eye on patient's hemoglobin level with plans to proceed with a bone marrow biopsy if she is found to have worsening anemia, new thrombocytopenia or worsening leukopenia         # History of thyroid cancer Z85.850 S/p left and right thyroid lobectomy 3/2004 with Hurthle cell carcinoma, minimally invasive  Pt reports that she received FLORES, but have no records available  Thyroid US showing some residual thyroid tissue 2016. Repeat US thyroid 11/2020 unchanged  TSH throughout 2020 has been significantly elevated,  9/2021 WNL 0.4  Tg and Tg Ab unremarkable  12/2019 CT chest without any evidence to suggest metastatic disease though solitary pulm cyst consistent with bleb/bulla was seen on right side.   12/30/2022 - TSH normal range. FT4 mild elevation.        Plan:     Reviewed labs, noted normal folic acid, B12 and improving hemoglobin level.  Discussed the plan to keep a close eye on patient's hemoglobin level with plans to proceed  with a bone marrow biopsy if she is found to have worsening anemia, new thrombocytopenia or worsening leukopenia   Unfortunately TSH was ordered but not done for this visit.  Follow-up with PCP and Nephrology as scheduled  Plan to follow-up in 3 months with repeat labs including peripheral smear and TSH level.        Portions of the record may have been created with voice recognition software. Occasional wrong-word or sound-a-like substitutions may have occurred due to the inherent limitations of voice recognition software.

## 2024-10-17 ENCOUNTER — OFFICE VISIT (OUTPATIENT)
Dept: HEMATOLOGY/ONCOLOGY | Facility: CLINIC | Age: 64
End: 2024-10-17
Payer: COMMERCIAL

## 2024-10-17 VITALS
WEIGHT: 293 LBS | BODY MASS INDEX: 53.92 KG/M2 | HEIGHT: 62 IN | TEMPERATURE: 98 F | SYSTOLIC BLOOD PRESSURE: 182 MMHG | DIASTOLIC BLOOD PRESSURE: 80 MMHG | OXYGEN SATURATION: 99 % | RESPIRATION RATE: 18 BRPM | HEART RATE: 76 BPM

## 2024-10-17 DIAGNOSIS — C73 THYROID CANCER: ICD-10-CM

## 2024-10-17 DIAGNOSIS — D53.9 MACROCYTIC ANEMIA: Primary | ICD-10-CM

## 2024-10-17 PROCEDURE — 99214 OFFICE O/P EST MOD 30 MIN: CPT | Mod: ,,,

## 2024-10-17 PROCEDURE — 3079F DIAST BP 80-89 MM HG: CPT | Mod: CPTII,,,

## 2024-10-17 PROCEDURE — 4010F ACE/ARB THERAPY RXD/TAKEN: CPT | Mod: CPTII,,,

## 2024-10-17 PROCEDURE — 3008F BODY MASS INDEX DOCD: CPT | Mod: CPTII,,,

## 2024-10-17 PROCEDURE — 3077F SYST BP >= 140 MM HG: CPT | Mod: CPTII,,,

## 2024-10-17 NOTE — PROGRESS NOTES
PATIENT: Sarah Majano  MRN: [id number]  DATE: 10/17/2024    Diagnosis: History of thyroid cancer.  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    Colonoscopy 2023  MMG 2021  DEXA 2019    Patient was following with us for a history of thyroid cancer however was referred again for a new diagnosis of worsening macrocytic anemia.    Subjective:   Interval History:    Today, 10/17/2024, patient denies any acute concerns today.  She denies any fevers, chills, night sweats, decreased appetite or weight loss.  She denies any new medications, ER or hospital visits since last follow-up visit with us. Labs discussed with patient and note elevated TSH of 11.38. She is compliant with current levothyroxine dose of 175 mcg managed by PCP. Patient is aware this dose should be increased due to subclinical  TSH level. She will discuss with her PCP at clinic visit next week.     Past Medical History:   Diagnosis Date    Anemia     Diabetes mellitus     Hurthle cell carcinoma of thyroid 2004    Hypertension     Mixed hyperlipidemia     Thyroid disease        Past Surgical HIstory:   Past Surgical History:   Procedure Laterality Date     SECTION, CLASSIC      THYROIDECTOMY         Family History   Problem Relation Name Age of Onset    Hyperthyroidism Mother      Diabetes Mother      Heart disease Father       Social History:  reports that she has never smoked. She has never used smokeless tobacco. She reports that she does not drink alcohol and does not use drugs.    Allergies:  Review of patient's allergies indicates:  No Known Allergies    Medications:  Current Outpatient Medications   Medication Sig Dispense Refill    alendronate  (FOSAMAX) 70 MG tablet Take 70 mg by mouth every 7 days.      allopurinoL (ZYLOPRIM) 100 MG tablet Take 100 mg by mouth once daily.      atorvastatin (LIPITOR) 40 MG tablet Take 40 mg by mouth.      cholecalciferol, vitamin D3, (VITAMIN D3) 50 mcg (2,000 unit) Cap capsule Vitamin D3   daily      ezetimibe (ZETIA) 10 mg tablet Take 10 mg by mouth once daily.      folic acid (FOLVITE) 1 MG tablet Take 1 tablet by mouth once daily 30 tablet 0    glimepiride (AMARYL) 1 MG tablet Take 1 mg by mouth.      lisinopriL-hydrochlorothiazide (PRINZIDE,ZESTORETIC) 20-12.5 mg per tablet Take 1 tablet by mouth.      metFORMIN (GLUCOPHAGE) 500 MG tablet Take 500 mg by mouth.      pioglitazone (ACTOS) 45 MG tablet Take 45 mg by mouth.      levothyroxine (SYNTHROID, LEVOTHROID) 175 MCG tablet Take 1 tablet (175 mcg total) by mouth before breakfast. 30 tablet 5     No current facility-administered medications for this visit.     Review of Systems:   CONSTITUTIONAL: no fevers, no chills,  weight gain, no fatigue, no weakness  HEMATOLOGIC: no abnormal bleeding, no abnormal bruising, no drenching night sweats  ONCOLOGIC: no new masses or lumps  HEENT: no vision loss, no tinnitus or hearing loss, no nose bleeding, no dysphagia, no odynophagia  CVS: no chest pain, no palpitations, no dyspnea on exertion  RESP: no shortness of breath, no hemoptysis, no cough  BREAST: no nipple discharge, no breast tenderness, no breast masses on self breast examination  GI: no nausea, no vomiting, no diarrhea, no constipation, no melena, no hematochezia, no hematemesis, no abdominal pain, no increase in abdominal girth  : no dysuria, no hematuria, no discharge  GYN: no abnormal vaginal bleeding, no dyspareunia, no vaginal discharge  INTEGUMENT: no rashes, no abnormal bruising, no nail pitting, no hyperpigmentation  NEURO: no falls, no memory loss, no paresthesias or dysesthesias, no urofecal incontinence or retention, no loss of strength on any  "extremity  MSK: no back pain, no new joint pain, no joint swelling  PSYCH: no suicidal or homicidal ideation, no depression, no insomnia, no anhedonia  ENDOCRINE: no heat or cold intolerance, no polyuria, no polydipsia    ECOG Performance Status: 1   Objective:      Vitals:    10/17/24 1420 10/17/24 1427   BP: (!) 192/80 (!) 182/80   BP Location: Left arm Left arm   Patient Position: Sitting Sitting   Pulse: 76    Resp: 18    Temp: 97.5 °F (36.4 °C)    TempSrc: Oral    SpO2: 99%    Weight: (!) 164.7 kg (363 lb 1.6 oz)    Height: 5' 2" (1.575 m)            BMI: Body mass index is 66.41 kg/m².    Physical Exam:  GA: AAOx3, NAD. Obese.   HEENT: NCAT, no oral ulcers  LYMPH: no cervical, axillary or supraclavicular region lymphadenopathy  CVS: s1s2 RRR, + murmur  RESP: CTA b/l, no crackles, no wheezes or rhonchi  ABD: soft, NT, ND, BS+, no hepatosplenomegaly  EXT: no deformities, mild b/l pitting pedal edema  SKIN: no rashes, no bruises or purpura, warm and dry  NEURO: normal mentation, strength 5/5 on all 4 extremities, no sensory deficits    Laboratory results:    10/10/24 creatinine 1.16, LFTs unremarkable, WBC count 3.4, hemoglobin 9.2, , platelet count 226, ANC 1.9, TSH 11.38, T4 1.50, Tg AB <1.0  07/01/2024 creatinine 1.2, LFTs unremarkable, iron 55, TIBC 309,% saturation 18, ferritin 177, folic acid 15.9, B12 993, WBC count 3.01, hemoglobin 9.1, MCV 97.3, platelet count 223, ANC 1.37      2/28/2024:  TSH 4.37, creatinine 1.32, eGFR 45, liver enzymes WNL, T Bili WNL, WBC 4.2, Hgb 8.9, Hct 29.4, , Platelets 353,000.    10/17/23: CMP unremarkable, except cratinine 1.4, wbc 3.4, hgb 9.4, plt 237, Magana negative,  09/01/2023 WBC count 3.6, hemoglobin 8.9, .1, platelet count 231.    08/17/2023 creatinine 1.46, LFTs within normal limits, TSH 1.7, iron 45, TIBC 284,% saturation 16, ferritin 96, folate 76, B12 598, , WBC count 3.7, hemoglobin 9.1, .4, haptoglobin 251, thyroglobulin less " than 0.1, thyroglobulin antibody less than 1.0, copper 146, SPEP/Rin  without M spike, kappa to lambda light chain ratio 2.59, retic count 1.9%, direct Mk negative,    07/12/2023 vitamin B12 587, folic acid more than 20, hemoglobin 8.0, , platelet count 232    06/12/2023 creatinine 1.64, LFTs within normal limits, albumin 4 point hemoglobin 8.3, ,  ferritin 130, iron saturation 23, iron 68, TIBC 296.     12/22/2022:  Creatinine 1.2, albumin 3.8, free T4 1.75, TSH 0.45, iron 70, sat% 24, ferritin 102, folate 42.7, B12 721, WBC 4.2, HGB 10.1, .0, platelets 226    9/20/21 Tg Ab <1 Tg <0.1      9/16/21 Cr 1.4 Alb 3.5 TP 8.1 FT4 1.44 AlkPhos 68 AST 8 ALT 15 TSH 0.43 A1C 5.9 Ferritin 132 Folate >20 WBC 5 RBC 3.05 Hg 9.3 MV 98.4  Retic 1.56% Vit D 33.5  6/16/21 Cr 1.4, TSH 1.04 FT4 1.47, thyroglobulin Ab < 0.1, Folate > 20, Ferritin 118, B12 712, albumin urine, <12, Iron 75, TIBC 298, iron sat 25%, WBC 3.4, Hgb 9.4, MCV 99.4, , Vit D 40, retic 1.10%  3/19/21 folate > 20, B12 864, retic 1.90%, WBC 5.7, Hgb 9.3, MCV 97.4, , , urine albumin < 12.0, Vit D 45, EPO 13.5, Thyroglobulin Ab <1.0, Thyroglobulin <0.1, Cr 1.1, ferritin 100, ESR 88, TSH 1.57, FT4 3.08, hgb electrophoresis pending**  12/16/20  Vit D 40.6 CRP 1.7 urine albumin <12 Cholesterol 11 HLD 62 LDL 42 ESR 61 Iron 60 TIBC 315 A1C 5.8 WBC 4.8 Hg 8.6 .5  FT4 1.33 TSH 26.98 Folate 8.3 B12 880 ferritin 101 Cr 1.2 TP 8.2 Retic 2.11 Copper 151 SPEP w/ RIN no M spike Abs kappa 35.6 Abs lambda 15.4 SFLC ratio 2.31  12/16/20 TG ab <1 Tg 0.1  9/8/20 Cr 1.9 CO2 20.9 TSH 62.79 A1C 5.7 Alb 4 TP 8.4 AlkPhos 55 AST 15 ALT 9 FT4 0.76 WBC 4.9 RBC 2.99 Hg 9.7  RDW 14.5  FT3 1.1  5/29/20 TSH 81.49 Free T4 0.73    Path:  10/17/23 Pat review of smear: Leukocytopenia with essentially normal differential, moderate macrocytic/hypochromic anemia, platelet population adequate, No circulation  blasts identified  2023 path review of smear:  Microcytic anemia, mild leukopenia, adequate platelet count.    3/31/2004 Thyroid left lobectomy: multinodular thyroid parenchyma. Small hypercellular dominant nodule. No evidence of malignancy identified. No parathyroid glands identified.  3/8/2004 Right thyroid lobectomy: oncocytic carcinoma (Hurthle cell carcinoma), minimally invasive 3.8cm, capsular invasion present.    Imagin/15/21 MMG: BIRADS2  20 US thyroid: 10x8.2x6mm residual thyroid tissue in right thyroid bed  20 Screening MMG: BIRADS 2  19 CT chest w/o contrast: prominent solitary pulmonary cyst on right most compatible with benign bulla/bleb. 4.5x4.2x4cm  4/15/16 US thyroid: small amount of residual thyroid tissue on the right    Assessment:     # Macrocytic anemia D53.9 Pt reports a hx of KEY, however her labs show mild macrocytosis with normal PLT and WBC counts. Likely related to CKD  During her initial visit SPEP, RIN, SFLC normal, B12 normal, copper normal, Retic inappropriately normal, folate deficient.  She was started on folic acid 1 mg q.day   More recently she was found to have worsening anemia with a hemoglobin on 8 grams/deciliter prompting a early a follow-up visit with us.    Repeat blood work in 2023 with normal SPEP/Rin, haptoglobin, Mk test, retic count, vitamin B12, folic acid level and free T4, TSH.  Iron panel consistent with anemia of chronic disease.  Discussed the plan to keep a close eye on patient's hemoglobin level with plans to proceed with a bone marrow biopsy if she is found to have worsening anemia, new thrombocytopenia or worsening leukopenia         # History of thyroid cancer Z85.850 S/p left and right thyroid lobectomy 3/2004 with Hurthle cell carcinoma, minimally invasive  Pt reports that she received FLORES, but have no records available  Thyroid US showing some residual thyroid tissue . Repeat US thyroid 2020 unchanged  TSH  throughout 2020 has been significantly elevated,  9/2021 WNL 0.4  Tg and Tg Ab unremarkable  12/2019 CT chest without any evidence to suggest metastatic disease though solitary pulm cyst consistent with bleb/bulla was seen on right side.   12/30/2022 - TSH normal range. FT4 mild elevation.        Plan:     Reviewed labs noted stable hemoglobin. Normal WBC and plt level.  -TSH 11.38 noted on recent labs managed by PCP. Per discussion with  Dr. Molina recommend increase current dose of levothyroxine and repeat TSH levels in 8-10 weeks.   Discussed the plan to keep a close eye on patient's hemoglobin level with plans to proceed with a bone marrow biopsy if she is found to have worsening anemia, new thrombocytopenia or worsening leukopenia   Follow-up with PCP and Nephrology as scheduled    Plan to follow-up in 3 months with repeat labs including peripheral smear

## 2025-01-16 NOTE — PROGRESS NOTES
PATIENT: Sarah Majano  MRN: [id number]  DATE: 2025    Diagnosis: History of thyroid cancer.  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    Colonoscopy 2023  MMG 2021  DEXA 2019    Patient was following with us for a history of thyroid cancer however was referred again for a new diagnosis of worsening macrocytic anemia.    Subjective:   Interval History:    Today, 25, patient denies any acute concerns today.  She denies any fevers, chills, night sweats, decreased appetite or weight loss.  She denies any new medications, ER or hospital visits since last follow-up visit with us. She is compliant with current levothyroxine dose of 175 mcg managed by PCP.    Past Medical History:   Diagnosis Date    Anemia     Diabetes mellitus     Hurthle cell carcinoma of thyroid 2004    Hypertension     Mixed hyperlipidemia     Thyroid disease        Past Surgical HIstory:   Past Surgical History:   Procedure Laterality Date     SECTION, CLASSIC      THYROIDECTOMY         Family History   Problem Relation Name Age of Onset    Hyperthyroidism Mother      Diabetes Mother      Heart disease Father       Social History:  reports that she has never smoked. She has never used smokeless tobacco. She reports that she does not drink alcohol and does not use drugs.    Allergies:  Review of patient's allergies indicates:  No Known Allergies    Medications:  Current Outpatient Medications   Medication Sig Dispense Refill    alendronate (FOSAMAX) 70 MG tablet Take 70 mg by mouth every 7 days.      allopurinoL (ZYLOPRIM) 100 MG tablet Take 100 mg by mouth once daily.      atorvastatin (LIPITOR) 40 MG tablet Take 40 mg by mouth.       cholecalciferol, vitamin D3, (VITAMIN D3) 50 mcg (2,000 unit) Cap capsule Vitamin D3   daily      ezetimibe (ZETIA) 10 mg tablet Take 10 mg by mouth once daily.      folic acid (FOLVITE) 1 MG tablet Take 1 tablet by mouth once daily 30 tablet 0    glimepiride (AMARYL) 1 MG tablet Take 1 mg by mouth.      lisinopriL-hydrochlorothiazide (PRINZIDE,ZESTORETIC) 20-12.5 mg per tablet Take 1 tablet by mouth.      metFORMIN (GLUCOPHAGE) 500 MG tablet Take 500 mg by mouth.      pioglitazone (ACTOS) 45 MG tablet Take 45 mg by mouth.      levothyroxine (SYNTHROID, LEVOTHROID) 175 MCG tablet Take 1 tablet (175 mcg total) by mouth before breakfast. 30 tablet 5     No current facility-administered medications for this visit.     Review of Systems:   CONSTITUTIONAL: no fevers, no chills,  weight gain, no fatigue, no weakness  HEMATOLOGIC: no abnormal bleeding, no abnormal bruising, no drenching night sweats  ONCOLOGIC: no new masses or lumps  HEENT: no vision loss, no tinnitus or hearing loss, no nose bleeding, no dysphagia, no odynophagia  CVS: no chest pain, no palpitations, no dyspnea on exertion  RESP: no shortness of breath, no hemoptysis, no cough  BREAST: no nipple discharge, no breast tenderness, no breast masses on self breast examination  GI: no nausea, no vomiting, no diarrhea, no constipation, no melena, no hematochezia, no hematemesis, no abdominal pain, no increase in abdominal girth  : no dysuria, no hematuria, no discharge  GYN: no abnormal vaginal bleeding, no dyspareunia, no vaginal discharge  INTEGUMENT: no rashes, no abnormal bruising, no nail pitting, no hyperpigmentation  NEURO: no falls, no memory loss, no paresthesias or dysesthesias, no urofecal incontinence or retention, no loss of strength on any extremity  MSK: no back pain, no new joint pain, no joint swelling  PSYCH: no suicidal or homicidal ideation, no depression, no insomnia, no anhedonia  ENDOCRINE: no heat or cold intolerance, no polyuria, no  "polydipsia    ECOG Performance Status: 1   Objective:      Vitals:    01/17/25 1043   BP: (!) 157/77   BP Location: Left arm   Patient Position: Sitting   Pulse: 80   Resp: 14   Temp: 97.6 °F (36.4 °C)   TempSrc: Oral   SpO2: 100%   Weight: (!) 162.6 kg (358 lb 8 oz)   Height: 5' 2" (1.575 m)             BMI: Body mass index is 65.57 kg/m².    Physical Exam:  GA: AAOx3, NAD. Obese.   HEENT: NCAT, no oral ulcers  LYMPH: no cervical, axillary or supraclavicular region lymphadenopathy  CVS: s1s2 RRR, + murmur  RESP: CTA b/l, no crackles, no wheezes or rhonchi  ABD: soft, NT, ND, BS+, no hepatosplenomegaly  EXT: no deformities, mild b/l pitting pedal edema  SKIN: no rashes, no bruises or purpura, warm and dry  NEURO: normal mentation, strength 5/5 on all 4 extremities, no sensory deficits    Laboratory results:  1/16/25  creatinine 1.14, ALK phos 124, AST 44, ALT 29, WBC count 3.5, hemoglobin 9.8, , platelet count 231, ANC 1.9, TSH 5.070, T4 1.86    10/10/24 creatinine 1.16, LFTs unremarkable, WBC count 3.4, hemoglobin 9.2, , platelet count 226, ANC 1.9, TSH 11.38, T4 1.50, Tg AB <1.0  07/01/2024 creatinine 1.2, LFTs unremarkable, iron 55, TIBC 309,% saturation 18, ferritin 177, folic acid 15.9, B12 993, WBC count 3.01, hemoglobin 9.1, MCV 97.3, platelet count 223, ANC 1.37      2/28/2024:  TSH 4.37, creatinine 1.32, eGFR 45, liver enzymes WNL, T Bili WNL, WBC 4.2, Hgb 8.9, Hct 29.4, , Platelets 353,000.    10/17/23: CMP unremarkable, except cratinine 1.4, wbc 3.4, hgb 9.4, plt 237, Magana negative,  09/01/2023 WBC count 3.6, hemoglobin 8.9, .1, platelet count 231.    08/17/2023 creatinine 1.46, LFTs within normal limits, TSH 1.7, iron 45, TIBC 284,% saturation 16, ferritin 96, folate 76, B12 598, , WBC count 3.7, hemoglobin 9.1, .4, haptoglobin 251, thyroglobulin less than 0.1, thyroglobulin antibody less than 1.0, copper 146, SPEP/Brenda  without M spike, kappa to lambda light " chain ratio 2.59, retic count 1.9%, direct Mk negative,    07/12/2023 vitamin B12 587, folic acid more than 20, hemoglobin 8.0, , platelet count 232    06/12/2023 creatinine 1.64, LFTs within normal limits, albumin 4 point hemoglobin 8.3, ,  ferritin 130, iron saturation 23, iron 68, TIBC 296.     12/22/2022:  Creatinine 1.2, albumin 3.8, free T4 1.75, TSH 0.45, iron 70, sat% 24, ferritin 102, folate 42.7, B12 721, WBC 4.2, HGB 10.1, .0, platelets 226    9/20/21 Tg Ab <1 Tg <0.1      9/16/21 Cr 1.4 Alb 3.5 TP 8.1 FT4 1.44 AlkPhos 68 AST 8 ALT 15 TSH 0.43 A1C 5.9 Ferritin 132 Folate >20 WBC 5 RBC 3.05 Hg 9.3 MV 98.4  Retic 1.56% Vit D 33.5  6/16/21 Cr 1.4, TSH 1.04 FT4 1.47, thyroglobulin Ab < 0.1, Folate > 20, Ferritin 118, B12 712, albumin urine, <12, Iron 75, TIBC 298, iron sat 25%, WBC 3.4, Hgb 9.4, MCV 99.4, , Vit D 40, retic 1.10%  3/19/21 folate > 20, B12 864, retic 1.90%, WBC 5.7, Hgb 9.3, MCV 97.4, , , urine albumin < 12.0, Vit D 45, EPO 13.5, Thyroglobulin Ab <1.0, Thyroglobulin <0.1, Cr 1.1, ferritin 100, ESR 88, TSH 1.57, FT4 3.08, hgb electrophoresis pending**  12/16/20  Vit D 40.6 CRP 1.7 urine albumin <12 Cholesterol 11 HLD 62 LDL 42 ESR 61 Iron 60 TIBC 315 A1C 5.8 WBC 4.8 Hg 8.6 .5  FT4 1.33 TSH 26.98 Folate 8.3 B12 880 ferritin 101 Cr 1.2 TP 8.2 Retic 2.11 Copper 151 SPEP w/ RIN no M spike Abs kappa 35.6 Abs lambda 15.4 SFLC ratio 2.31  12/16/20 TG ab <1 Tg 0.1  9/8/20 Cr 1.9 CO2 20.9 TSH 62.79 A1C 5.7 Alb 4 TP 8.4 AlkPhos 55 AST 15 ALT 9 FT4 0.76 WBC 4.9 RBC 2.99 Hg 9.7  RDW 14.5  FT3 1.1  5/29/20 TSH 81.49 Free T4 0.73    Path:  10/17/23 Pat review of smear: Leukocytopenia with essentially normal differential, moderate macrocytic/hypochromic anemia, platelet population adequate, No circulation blasts identified  08/18/2023 path review of smear:  Microcytic anemia, mild leukopenia, adequate platelet  count.    3/31/2004 Thyroid left lobectomy: multinodular thyroid parenchyma. Small hypercellular dominant nodule. No evidence of malignancy identified. No parathyroid glands identified.  3/8/2004 Right thyroid lobectomy: oncocytic carcinoma (Hurthle cell carcinoma), minimally invasive 3.8cm, capsular invasion present.    Imagin/15/21 MMG: BIRADS2  20 US thyroid: 10x8.2x6mm residual thyroid tissue in right thyroid bed  20 Screening MMG: BIRADS 2  19 CT chest w/o contrast: prominent solitary pulmonary cyst on right most compatible with benign bulla/bleb. 4.5x4.2x4cm  4/15/16 US thyroid: small amount of residual thyroid tissue on the right    Assessment:     # Macrocytic anemia D53.9 Pt reports a hx of KEY, however her labs show mild macrocytosis with normal PLT and WBC counts. Likely related to CKD  During her initial visit SPEP, RIN, SFLC normal, B12 normal, copper normal, Retic inappropriately normal, folate deficient.  She was started on folic acid 1 mg q.day   More recently she was found to have worsening anemia with a hemoglobin on 8 grams/deciliter prompting a early a follow-up visit with us.    Repeat blood work in 2023 with normal SPEP/Rin, haptoglobin, Mk test, retic count, vitamin B12, folic acid level and free T4, TSH.  Iron panel consistent with anemia of chronic disease.  Discussed the plan to keep a close eye on patient's hemoglobin level with plans to proceed with a bone marrow biopsy if she is found to have worsening anemia, new thrombocytopenia or worsening leukopenia         # History of thyroid cancer Z85.850 S/p left and right thyroid lobectomy 3/2004 with Hurthle cell carcinoma, minimally invasive  Pt reports that she received FLORES, but have no records available  Thyroid US showing some residual thyroid tissue . Repeat US thyroid 2020 unchanged  TSH throughout  has been significantly elevated,  2021 WNL 0.4  Tg and Tg Ab unremarkable  2019 CT chest  without any evidence to suggest metastatic disease though solitary pulm cyst consistent with bleb/bulla was seen on right side.   12/30/2022 - TSH normal range. FT4 mild elevation.        Plan:     Reviewed labs stable      Discussed the plan to keep a close eye on patient's hemoglobin level with plans to proceed with a bone marrow biopsy if she is found to have worsening anemia, new thrombocytopenia or worsening leukopenia   Follow-up with PCP and Nephrology as scheduled    Plan to follow-up in 3 months with repeat labs including peripheral smear      Angelique NIXON-RAVI

## 2025-01-17 ENCOUNTER — OFFICE VISIT (OUTPATIENT)
Dept: HEMATOLOGY/ONCOLOGY | Facility: CLINIC | Age: 65
End: 2025-01-17
Payer: COMMERCIAL

## 2025-01-17 VITALS
BODY MASS INDEX: 53.92 KG/M2 | WEIGHT: 293 LBS | TEMPERATURE: 98 F | RESPIRATION RATE: 14 BRPM | HEIGHT: 62 IN | OXYGEN SATURATION: 100 % | SYSTOLIC BLOOD PRESSURE: 157 MMHG | HEART RATE: 80 BPM | DIASTOLIC BLOOD PRESSURE: 77 MMHG

## 2025-01-17 DIAGNOSIS — D53.9 MACROCYTIC ANEMIA: ICD-10-CM

## 2025-01-17 DIAGNOSIS — Z85.850 HISTORY OF THYROID CANCER: Primary | ICD-10-CM

## 2025-01-17 PROCEDURE — 3008F BODY MASS INDEX DOCD: CPT | Mod: CPTII,,,

## 2025-01-17 PROCEDURE — 1160F RVW MEDS BY RX/DR IN RCRD: CPT | Mod: CPTII,,,

## 2025-01-17 PROCEDURE — 3078F DIAST BP <80 MM HG: CPT | Mod: CPTII,,,

## 2025-01-17 PROCEDURE — 1159F MED LIST DOCD IN RCRD: CPT | Mod: CPTII,,,

## 2025-01-17 PROCEDURE — 99214 OFFICE O/P EST MOD 30 MIN: CPT | Mod: ,,,

## 2025-01-17 PROCEDURE — 3077F SYST BP >= 140 MM HG: CPT | Mod: CPTII,,,

## 2025-04-29 NOTE — PROGRESS NOTES
PATIENT: Sarah Majano  MRN: [id number]  DATE: 2025    Diagnosis: History of thyroid cancer.  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    Colonoscopy 2023  MMG 2021  DEXA 2019    Patient was following with us for a history of thyroid cancer however was referred again for a new diagnosis of worsening macrocytic anemia.    Subjective:   Interval History:    Today, 25, patient denies any acute concerns today.  She denies any fevers, chills, night sweats, decreased appetite or weight loss.  She denies any new medications, ER or hospital visits since last follow-up visit with us. She is compliant with current levothyroxine dose of 175 mcg managed by PCP. Patient did not complete peripheral smear.    Past Medical History:   Diagnosis Date    Anemia     Diabetes mellitus     Hurthle cell carcinoma of thyroid 2004    Hypertension     Mixed hyperlipidemia     Thyroid disease        Past Surgical HIstory:   Past Surgical History:   Procedure Laterality Date     SECTION, CLASSIC      THYROIDECTOMY         Family History   Problem Relation Name Age of Onset    Hyperthyroidism Mother      Diabetes Mother      Heart disease Father       Social History:  reports that she has never smoked. She has never used smokeless tobacco. She reports that she does not drink alcohol and does not use drugs.    Allergies:  Review of patient's allergies indicates:  No Known Allergies    Medications:  Current Outpatient Medications   Medication Sig Dispense Refill    alendronate (FOSAMAX) 70 MG tablet Take 70 mg by mouth every 7 days.      allopurinoL (ZYLOPRIM) 100 MG tablet Take 100 mg by mouth once daily.      atorvastatin (LIPITOR) 40  MG tablet Take 40 mg by mouth.      cholecalciferol, vitamin D3, (VITAMIN D3) 50 mcg (2,000 unit) Cap capsule Vitamin D3   daily      ezetimibe (ZETIA) 10 mg tablet Take 10 mg by mouth once daily.      folic acid (FOLVITE) 1 MG tablet Take 1 tablet by mouth once daily 30 tablet 0    glimepiride (AMARYL) 1 MG tablet Take 1 mg by mouth.      lisinopriL-hydrochlorothiazide (PRINZIDE,ZESTORETIC) 20-12.5 mg per tablet Take 1 tablet by mouth.      metFORMIN (GLUCOPHAGE) 500 MG tablet Take 500 mg by mouth.      pioglitazone (ACTOS) 45 MG tablet Take 45 mg by mouth.      levothyroxine (SYNTHROID, LEVOTHROID) 175 MCG tablet Take 1 tablet (175 mcg total) by mouth before breakfast. 30 tablet 5     No current facility-administered medications for this visit.     Review of Systems:   CONSTITUTIONAL: no fevers, no chills,  weight gain, no fatigue, no weakness  HEMATOLOGIC: no abnormal bleeding, no abnormal bruising, no drenching night sweats  ONCOLOGIC: no new masses or lumps  HEENT: no vision loss, no tinnitus or hearing loss, no nose bleeding, no dysphagia, no odynophagia  CVS: no chest pain, no palpitations, no dyspnea on exertion  RESP: no shortness of breath, no hemoptysis, no cough  BREAST: no nipple discharge, no breast tenderness, no breast masses on self breast examination  GI: no nausea, no vomiting, no diarrhea, no constipation, no melena, no hematochezia, no hematemesis, no abdominal pain, no increase in abdominal girth  : no dysuria, no hematuria, no discharge  GYN: no abnormal vaginal bleeding, no dyspareunia, no vaginal discharge  INTEGUMENT: no rashes, no abnormal bruising, no nail pitting, no hyperpigmentation  NEURO: no falls, no memory loss, no paresthesias or dysesthesias, no urofecal incontinence or retention, no loss of strength on any extremity  MSK: no back pain, no new joint pain, no joint swelling  PSYCH: no suicidal or homicidal ideation, no depression, no insomnia, no anhedonia  ENDOCRINE: no heat or  "cold intolerance, no polyuria, no polydipsia    ECOG Performance Status: 1   Objective:      Vitals:    04/30/25 1017   BP: (!) 188/63   BP Location: Right forearm   Patient Position: Sitting   Pulse: 75   Resp: 14   Temp: 97.7 °F (36.5 °C)   TempSrc: Oral   SpO2: 100%   Weight: (!) 166.6 kg (367 lb 4.8 oz)   Height: 5' 2" (1.575 m)               BMI: Body mass index is 67.18 kg/m².    Physical Exam:  GA: AAOx3, NAD. Obese.   HEENT: NCAT, no oral ulcers  LYMPH: no cervical, axillary or supraclavicular region lymphadenopathy  CVS: s1s2 RRR, + murmur  RESP: CTA b/l, no crackles, no wheezes or rhonchi  ABD: soft, NT, ND, BS+, no hepatosplenomegaly  EXT: no deformities, mild b/l pitting pedal edema  SKIN: no rashes, no bruises or purpura, warm and dry  NEURO: normal mentation, strength 5/5 on all 4 extremities, no sensory deficits    Laboratory results:  4/15/25  creatinine 1.06, ALK phos 116, AST 36, ALT 25, WBC count 4.3, hemoglobin 10.2, , platelet count 220, ANC 2.8, TSH 13.180, T3 69    1/16/25  creatinine 1.14, ALK phos 124, AST 44, ALT 29, WBC count 3.5, hemoglobin 9.8, , platelet count 231, ANC 1.9, TSH 5.070, T4 1.86    10/10/24 creatinine 1.16, LFTs unremarkable, WBC count 3.4, hemoglobin 9.2, , platelet count 226, ANC 1.9, TSH 11.38, T4 1.50, Tg AB <1.0  07/01/2024 creatinine 1.2, LFTs unremarkable, iron 55, TIBC 309,% saturation 18, ferritin 177, folic acid 15.9, B12 993, WBC count 3.01, hemoglobin 9.1, MCV 97.3, platelet count 223, ANC 1.37      2/28/2024:  TSH 4.37, creatinine 1.32, eGFR 45, liver enzymes WNL, T Bili WNL, WBC 4.2, Hgb 8.9, Hct 29.4, , Platelets 353,000.    10/17/23: CMP unremarkable, except cratinine 1.4, wbc 3.4, hgb 9.4, plt 237, Magana negative,  09/01/2023 WBC count 3.6, hemoglobin 8.9, .1, platelet count 231.    08/17/2023 creatinine 1.46, LFTs within normal limits, TSH 1.7, iron 45, TIBC 284,% saturation 16, ferritin 96, folate 76, B12 598, , " WBC count 3.7, hemoglobin 9.1, .4, haptoglobin 251, thyroglobulin less than 0.1, thyroglobulin antibody less than 1.0, copper 146, SPEP/Rin  without M spike, kappa to lambda light chain ratio 2.59, retic count 1.9%, direct Mk negative,    07/12/2023 vitamin B12 587, folic acid more than 20, hemoglobin 8.0, , platelet count 232    06/12/2023 creatinine 1.64, LFTs within normal limits, albumin 4 point hemoglobin 8.3, ,  ferritin 130, iron saturation 23, iron 68, TIBC 296.     12/22/2022:  Creatinine 1.2, albumin 3.8, free T4 1.75, TSH 0.45, iron 70, sat% 24, ferritin 102, folate 42.7, B12 721, WBC 4.2, HGB 10.1, .0, platelets 226    9/20/21 Tg Ab <1 Tg <0.1      9/16/21 Cr 1.4 Alb 3.5 TP 8.1 FT4 1.44 AlkPhos 68 AST 8 ALT 15 TSH 0.43 A1C 5.9 Ferritin 132 Folate >20 WBC 5 RBC 3.05 Hg 9.3 MV 98.4  Retic 1.56% Vit D 33.5  6/16/21 Cr 1.4, TSH 1.04 FT4 1.47, thyroglobulin Ab < 0.1, Folate > 20, Ferritin 118, B12 712, albumin urine, <12, Iron 75, TIBC 298, iron sat 25%, WBC 3.4, Hgb 9.4, MCV 99.4, , Vit D 40, retic 1.10%  3/19/21 folate > 20, B12 864, retic 1.90%, WBC 5.7, Hgb 9.3, MCV 97.4, , , urine albumin < 12.0, Vit D 45, EPO 13.5, Thyroglobulin Ab <1.0, Thyroglobulin <0.1, Cr 1.1, ferritin 100, ESR 88, TSH 1.57, FT4 3.08, hgb electrophoresis pending**  12/16/20  Vit D 40.6 CRP 1.7 urine albumin <12 Cholesterol 11 HLD 62 LDL 42 ESR 61 Iron 60 TIBC 315 A1C 5.8 WBC 4.8 Hg 8.6 .5  FT4 1.33 TSH 26.98 Folate 8.3 B12 880 ferritin 101 Cr 1.2 TP 8.2 Retic 2.11 Copper 151 SPEP w/ RIN no M spike Abs kappa 35.6 Abs lambda 15.4 SFLC ratio 2.31  12/16/20 TG ab <1 Tg 0.1  9/8/20 Cr 1.9 CO2 20.9 TSH 62.79 A1C 5.7 Alb 4 TP 8.4 AlkPhos 55 AST 15 ALT 9 FT4 0.76 WBC 4.9 RBC 2.99 Hg 9.7  RDW 14.5  FT3 1.1  5/29/20 TSH 81.49 Free T4 0.73    Path:  10/17/23 Pat review of smear: Leukocytopenia with essentially normal differential, moderate  macrocytic/hypochromic anemia, platelet population adequate, No circulation blasts identified  2023 path review of smear:  Microcytic anemia, mild leukopenia, adequate platelet count.    3/31/2004 Thyroid left lobectomy: multinodular thyroid parenchyma. Small hypercellular dominant nodule. No evidence of malignancy identified. No parathyroid glands identified.  3/8/2004 Right thyroid lobectomy: oncocytic carcinoma (Hurthle cell carcinoma), minimally invasive 3.8cm, capsular invasion present.    Imagin/15/21 MMG: BIRADS2  20 US thyroid: 10x8.2x6mm residual thyroid tissue in right thyroid bed  20 Screening MMG: BIRADS 2  19 CT chest w/o contrast: prominent solitary pulmonary cyst on right most compatible with benign bulla/bleb. 4.5x4.2x4cm  4/15/16 US thyroid: small amount of residual thyroid tissue on the right    Assessment:     # Macrocytic anemia D53.9 Pt reports a hx of KEY, however her labs show mild macrocytosis with normal PLT and WBC counts. Likely related to CKD  During her initial visit SPEP, RIN, SFLC normal, B12 normal, copper normal, Retic inappropriately normal, folate deficient.  She was started on folic acid 1 mg q.day   More recently she was found to have worsening anemia with a hemoglobin on 8 grams/deciliter prompting a early a follow-up visit with us.    Repeat blood work in 2023 with normal SPEP/Rin, haptoglobin, Mk test, retic count, vitamin B12, folic acid level and free T4, TSH.  Iron panel consistent with anemia of chronic disease.  Discussed the plan to keep a close eye on patient's hemoglobin level with plans to proceed with a bone marrow biopsy if she is found to have worsening anemia, new thrombocytopenia or worsening leukopenia         # History of thyroid cancer Z85.850 S/p left and right thyroid lobectomy 3/2004 with Hurthle cell carcinoma, minimally invasive  Pt reports that she received FLORES, but have no records available  Thyroid US showing some  residual thyroid tissue 2016. Repeat US thyroid 11/2020 unchanged  TSH throughout 2020 has been significantly elevated,  9/2021 WNL 0.4  Tg and Tg Ab unremarkable  12/2019 CT chest without any evidence to suggest metastatic disease though solitary pulm cyst consistent with bleb/bulla was seen on right side.   12/30/2022 - TSH normal range. FT4 mild elevation.        Plan:     Reviewed labs stable      Discussed the plan to keep a close eye on patient's hemoglobin level with plans to proceed with a bone marrow biopsy if she is found to have worsening anemia, new thrombocytopenia or worsening leukopenia   Follow-up with PCP and Nephrology as scheduled    Plan to follow-up in 3 months with repeat labs including peripheral smear      Angelique NIXON-RAVI

## 2025-04-30 ENCOUNTER — OFFICE VISIT (OUTPATIENT)
Dept: HEMATOLOGY/ONCOLOGY | Facility: CLINIC | Age: 65
End: 2025-04-30
Payer: COMMERCIAL

## 2025-04-30 VITALS
BODY MASS INDEX: 53.92 KG/M2 | HEART RATE: 75 BPM | DIASTOLIC BLOOD PRESSURE: 63 MMHG | RESPIRATION RATE: 14 BRPM | OXYGEN SATURATION: 100 % | WEIGHT: 293 LBS | TEMPERATURE: 98 F | HEIGHT: 62 IN | SYSTOLIC BLOOD PRESSURE: 188 MMHG

## 2025-04-30 DIAGNOSIS — D53.9 MACROCYTIC ANEMIA: Primary | ICD-10-CM

## 2025-04-30 DIAGNOSIS — Z85.850 HISTORY OF THYROID CANCER: ICD-10-CM

## 2025-07-30 ENCOUNTER — OFFICE VISIT (OUTPATIENT)
Dept: HEMATOLOGY/ONCOLOGY | Facility: CLINIC | Age: 65
End: 2025-07-30
Payer: COMMERCIAL

## 2025-07-30 VITALS
WEIGHT: 293 LBS | HEIGHT: 62 IN | BODY MASS INDEX: 53.92 KG/M2 | TEMPERATURE: 98 F | SYSTOLIC BLOOD PRESSURE: 168 MMHG | OXYGEN SATURATION: 98 % | RESPIRATION RATE: 12 BRPM | DIASTOLIC BLOOD PRESSURE: 74 MMHG | HEART RATE: 72 BPM

## 2025-07-30 DIAGNOSIS — D53.9 MACROCYTIC ANEMIA: Primary | ICD-10-CM

## 2025-07-30 DIAGNOSIS — C73 THYROID CANCER: ICD-10-CM

## 2025-07-30 PROCEDURE — 99214 OFFICE O/P EST MOD 30 MIN: CPT | Mod: ,,,

## 2025-07-30 PROCEDURE — 3008F BODY MASS INDEX DOCD: CPT | Mod: CPTII,,,

## 2025-07-30 PROCEDURE — 3077F SYST BP >= 140 MM HG: CPT | Mod: CPTII,,,

## 2025-07-30 PROCEDURE — 3078F DIAST BP <80 MM HG: CPT | Mod: CPTII,,,

## 2025-07-30 RX ORDER — FOLIC ACID 1 MG/1
1 TABLET ORAL DAILY
Qty: 90 TABLET | Refills: 3 | Status: SHIPPED | OUTPATIENT
Start: 2025-07-30

## 2025-07-30 NOTE — PROGRESS NOTES
PATIENT: Sarah Majano  MRN: [id number]  DATE: 2025    Diagnosis: History of thyroid cancer.  Anemia    Chief Complaint:    No chief complaint on file.      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    Colonoscopy 2023  MMG 2021  DEXA 2019    Patient was following with us for a history of thyroid cancer however was referred again for a new diagnosis of worsening macrocytic anemia.    Subjective:   Interval History:    Today, 25, patient denies any acute concerns today.  She denies any fevers, chills, night sweats, decreased appetite or weight loss.  She denies any new medications, ER or hospital visits since last follow-up visit with us. Patient takes oral iron 3 times weekly. She can't remember if she takes folic acid. She is compliant with current levothyroxine dose of 175 mcg managed by PCP.     Past Medical History:   Diagnosis Date    Anemia     Diabetes mellitus     Hurthle cell carcinoma of thyroid 2004    Hypertension     Mixed hyperlipidemia     Thyroid disease        Past Surgical HIstory:   Past Surgical History:   Procedure Laterality Date     SECTION, CLASSIC      THYROIDECTOMY         Family History   Problem Relation Name Age of Onset    Hyperthyroidism Mother      Diabetes Mother      Heart disease Father       Social History:  reports that she has never smoked. She has never used smokeless tobacco. She reports that she does not drink alcohol and does not use drugs.    Allergies:  Review of patient's allergies indicates:  No Known Allergies    Medications:  Current Outpatient Medications   Medication Sig Dispense Refill    alendronate (FOSAMAX) 70 MG tablet Take 70 mg by mouth every 7 days.      allopurinoL (ZYLOPRIM) 100 MG tablet Take 100 mg by mouth once daily.       atorvastatin (LIPITOR) 40 MG tablet Take 40 mg by mouth.      cholecalciferol, vitamin D3, (VITAMIN D3) 50 mcg (2,000 unit) Cap capsule Vitamin D3   daily      ezetimibe (ZETIA) 10 mg tablet Take 10 mg by mouth once daily.      folic acid (FOLVITE) 1 MG tablet Take 1 tablet by mouth once daily 30 tablet 0    glimepiride (AMARYL) 1 MG tablet Take 1 mg by mouth.      levothyroxine (SYNTHROID, LEVOTHROID) 175 MCG tablet Take 1 tablet (175 mcg total) by mouth before breakfast. 30 tablet 5    lisinopriL-hydrochlorothiazide (PRINZIDE,ZESTORETIC) 20-12.5 mg per tablet Take 1 tablet by mouth.      metFORMIN (GLUCOPHAGE) 500 MG tablet Take 500 mg by mouth.      pioglitazone (ACTOS) 45 MG tablet Take 45 mg by mouth.       No current facility-administered medications for this visit.     Review of Systems:   CONSTITUTIONAL: no fevers, no chills,  weight gain, no fatigue, no weakness  HEMATOLOGIC: no abnormal bleeding, no abnormal bruising, no drenching night sweats  ONCOLOGIC: no new masses or lumps  HEENT: no vision loss, no tinnitus or hearing loss, no nose bleeding, no dysphagia, no odynophagia  CVS: no chest pain, no palpitations, no dyspnea on exertion  RESP: no shortness of breath, no hemoptysis, no cough  BREAST: no nipple discharge, no breast tenderness, no breast masses on self breast examination  GI: no nausea, no vomiting, no diarrhea, no constipation, no melena, no hematochezia, no hematemesis, no abdominal pain, no increase in abdominal girth  : no dysuria, no hematuria, no discharge  GYN: no abnormal vaginal bleeding, no dyspareunia, no vaginal discharge  INTEGUMENT: no rashes, no abnormal bruising, no nail pitting, no hyperpigmentation  NEURO: no falls, no memory loss, no paresthesias or dysesthesias, no urofecal incontinence or retention, no loss of strength on any extremity  MSK: no back pain, no new joint pain, no joint swelling  PSYCH: no suicidal or homicidal ideation, no depression, no insomnia, no  "anhedonia  ENDOCRINE: no heat or cold intolerance, no polyuria, no polydipsia    ECOG Performance Status: 1   Objective:   Blood pressure (!) 168/74, pulse 72, temperature 98.1 °F (36.7 °C), temperature source Oral, resp. rate 12, height 5' 2" (1.575 m), weight (!) 166.2 kg (366 lb 6.4 oz), SpO2 98%.            BMI: Body mass index is 67.18 kg/m².    Physical Exam:  GA: AAOx3, NAD. Obese.   HEENT: NCAT, no oral ulcers  LYMPH: no cervical, axillary or supraclavicular region lymphadenopathy  CVS: s1s2 RRR, + murmur  RESP: CTA b/l, no crackles, no wheezes or rhonchi  ABD: soft, NT, ND, BS+, no hepatosplenomegaly  EXT: no deformities, mild b/l pitting pedal edema  SKIN: no rashes, no bruises or purpura, warm and dry  NEURO: normal mentation, strength 5/5 on all 4 extremities, no sensory deficits    Laboratory results:  07/25/25  CMP unremarkable, WBC count 3.33, hemoglobin 10.4, MCV 99.7, platelet count 192, ANC 1.75, iron 59, iron sat 17%, ferritin 84, folic acid 17.85, B12 1111  4/15/25  creatinine 1.06, ALK phos 116, AST 36, ALT 25, WBC count 4.3, hemoglobin 10.2, , platelet count 220, ANC 2.8, TSH 13.180, T3 69    1/16/25  creatinine 1.14, ALK phos 124, AST 44, ALT 29, WBC count 3.5, hemoglobin 9.8, , platelet count 231, ANC 1.9, TSH 5.070, T4 1.86    10/10/24 creatinine 1.16, LFTs unremarkable, WBC count 3.4, hemoglobin 9.2, , platelet count 226, ANC 1.9, TSH 11.38, T4 1.50, Tg AB <1.0  07/01/2024 creatinine 1.2, LFTs unremarkable, iron 55, TIBC 309,% saturation 18, ferritin 177, folic acid 15.9, B12 993, WBC count 3.01, hemoglobin 9.1, MCV 97.3, platelet count 223, ANC 1.37      2/28/2024:  TSH 4.37, creatinine 1.32, eGFR 45, liver enzymes WNL, T Bili WNL, WBC 4.2, Hgb 8.9, Hct 29.4, , Platelets 353,000.    10/17/23: CMP unremarkable, except cratinine 1.4, wbc 3.4, hgb 9.4, plt 237, Mag[place] negative,  09/01/2023 WBC count 3.6, hemoglobin 8.9, .1, platelet count 231.    08/17/2023 " creatinine 1.46, LFTs within normal limits, TSH 1.7, iron 45, TIBC 284,% saturation 16, ferritin 96, folate 76, B12 598, , WBC count 3.7, hemoglobin 9.1, .4, haptoglobin 251, thyroglobulin less than 0.1, thyroglobulin antibody less than 1.0, copper 146, SPEP/Rin  without M spike, kappa to lambda light chain ratio 2.59, retic count 1.9%, direct Mk negative,    07/12/2023 vitamin B12 587, folic acid more than 20, hemoglobin 8.0, , platelet count 232    06/12/2023 creatinine 1.64, LFTs within normal limits, albumin 4 point hemoglobin 8.3, ,  ferritin 130, iron saturation 23, iron 68, TIBC 296.     12/22/2022:  Creatinine 1.2, albumin 3.8, free T4 1.75, TSH 0.45, iron 70, sat% 24, ferritin 102, folate 42.7, B12 721, WBC 4.2, HGB 10.1, .0, platelets 226    9/20/21 Tg Ab <1 Tg <0.1      9/16/21 Cr 1.4 Alb 3.5 TP 8.1 FT4 1.44 AlkPhos 68 AST 8 ALT 15 TSH 0.43 A1C 5.9 Ferritin 132 Folate >20 WBC 5 RBC 3.05 Hg 9.3 MV 98.4  Retic 1.56% Vit D 33.5  6/16/21 Cr 1.4, TSH 1.04 FT4 1.47, thyroglobulin Ab < 0.1, Folate > 20, Ferritin 118, B12 712, albumin urine, <12, Iron 75, TIBC 298, iron sat 25%, WBC 3.4, Hgb 9.4, MCV 99.4, , Vit D 40, retic 1.10%  3/19/21 folate > 20, B12 864, retic 1.90%, WBC 5.7, Hgb 9.3, MCV 97.4, , , urine albumin < 12.0, Vit D 45, EPO 13.5, Thyroglobulin Ab <1.0, Thyroglobulin <0.1, Cr 1.1, ferritin 100, ESR 88, TSH 1.57, FT4 3.08, hgb electrophoresis pending**  12/16/20  Vit D 40.6 CRP 1.7 urine albumin <12 Cholesterol 11 HLD 62 LDL 42 ESR 61 Iron 60 TIBC 315 A1C 5.8 WBC 4.8 Hg 8.6 .5  FT4 1.33 TSH 26.98 Folate 8.3 B12 880 ferritin 101 Cr 1.2 TP 8.2 Retic 2.11 Copper 151 SPEP w/ RIN no M spike Abs kappa 35.6 Abs lambda 15.4 SFLC ratio 2.31  12/16/20 TG ab <1 Tg 0.1  9/8/20 Cr 1.9 CO2 20.9 TSH 62.79 A1C 5.7 Alb 4 TP 8.4 AlkPhos 55 AST 15 ALT 9 FT4 0.76 WBC 4.9 RBC 2.99 Hg 9.7  RDW 14.5  FT3  1.1  20 TSH 81.49 Free T4 0.73    Path:  10/17/23 Pat review of smear: Leukocytopenia with essentially normal differential, moderate macrocytic/hypochromic anemia, platelet population adequate, No circulation blasts identified  2023 path review of smear:  Microcytic anemia, mild leukopenia, adequate platelet count.    3/31/2004 Thyroid left lobectomy: multinodular thyroid parenchyma. Small hypercellular dominant nodule. No evidence of malignancy identified. No parathyroid glands identified.  3/8/2004 Right thyroid lobectomy: oncocytic carcinoma (Hurthle cell carcinoma), minimally invasive 3.8cm, capsular invasion present.    Imagin/15/21 MMG: BIRADS2  20 US thyroid: 10x8.2x6mm residual thyroid tissue in right thyroid bed  20 Screening MMG: BIRADS 2  19 CT chest w/o contrast: prominent solitary pulmonary cyst on right most compatible with benign bulla/bleb. 4.5x4.2x4cm  4/15/16 US thyroid: small amount of residual thyroid tissue on the right    Assessment:     # Macrocytic anemia D53.9 Pt reports a hx of KEY, however her labs show mild macrocytosis with normal PLT and WBC counts. Likely related to CKD  During her initial visit SPEP, RIN, SFLC normal, B12 normal, copper normal, Retic inappropriately normal, folate deficient.  She was started on folic acid 1 mg q.day   More recently she was found to have worsening anemia with a hemoglobin on 8 grams/deciliter prompting a early a follow-up visit with us.    Repeat blood work in 2023 with normal SPEP/Rin, haptoglobin, Mk test, retic count, vitamin B12, folic acid level and free T4, TSH.  Iron panel consistent with anemia of chronic disease.  Discussed the plan to keep a close eye on patient's hemoglobin level with plans to proceed with a bone marrow biopsy if she is found to have worsening anemia, new thrombocytopenia or worsening leukopenia         # History of thyroid cancer Z85.850 S/p left and right thyroid lobectomy 3/2004  with Hurthle cell carcinoma, minimally invasive  Pt reports that she received FLORES, but have no records available  Thyroid US showing some residual thyroid tissue 2016. Repeat US thyroid 11/2020 unchanged  TSH throughout 2020 has been significantly elevated,  9/2021 WNL 0.4  Tg and Tg Ab unremarkable  12/2019 CT chest without any evidence to suggest metastatic disease though solitary pulm cyst consistent with bleb/bulla was seen on right side.   12/30/2022 - TSH normal range. FT4 mild elevation.        Plan:     Reviewed labs stable      Discussed the plan to keep a close eye on patient's hemoglobin level with plans to proceed with a bone marrow biopsy if she is found to have worsening anemia, new thrombocytopenia or worsening leukopenia   Follow-up with PCP and Nephrology as scheduled    Plan to follow-up in 3 months with repeat labs including peripheral smear